# Patient Record
Sex: MALE | Race: WHITE | NOT HISPANIC OR LATINO | Employment: UNEMPLOYED | URBAN - METROPOLITAN AREA
[De-identification: names, ages, dates, MRNs, and addresses within clinical notes are randomized per-mention and may not be internally consistent; named-entity substitution may affect disease eponyms.]

---
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
## 2022-07-11 ENCOUNTER — TELEPHONE (OUTPATIENT)
Dept: GASTROENTEROLOGY | Facility: CLINIC | Age: 22
End: 2022-07-11

## 2022-07-11 NOTE — TELEPHONE ENCOUNTER
Caller: PRIOR NILAY    Eric call back number: [phone]    Chief complaint: DIARRHEA AND ACID REFLUX MEDICATION IS NO LONGER WORKING.    Type of visit: FOLLOW-UP    Requested date: PLEASE CALL TO SCHEDULE.    If rescheduling, when is the original appointment: 7/11/2022     Additional notes: APPOINTMENT WAS DOUBLE BOOKED IN ERROR.

## 2022-07-12 NOTE — TELEPHONE ENCOUNTER
Patient called, spoke with mother. Advise as per Rabia's note. She verb understanding and will relay message to the patient.

## 2022-08-24 NOTE — TELEPHONE ENCOUNTER
Caller: Jeffrey Rosas ELO    Relationship: Self    Best call back number: [phone]    Requested Prescriptions:   Requested Prescriptions     Pending Prescriptions Disp Refills   • omeprazole (priLOSEC) 40 MG capsule 30 capsule 12     Sig: Take 1 capsule by mouth Daily.   • hyoscyamine (LEVSIN) 0.125 MG SL tablet 60 tablet 5     Sig: Take 1 tablet by mouth Every 4 (Four) Hours As Needed for Cramping.        Pharmacy where request should be sent:  [address] [place] [address]     Does the patient have less than a 3 day supply:  [x] Yes  [] No    Kiana RIVERO Rep   08/24/22 09:38 EDT

## 2022-08-29 RX ORDER — OMEPRAZOLE 40 MG/1
40 CAPSULE, DELAYED RELEASE ORAL DAILY
Qty: 30 CAPSULE | Refills: 12 | OUTPATIENT
Start: 2022-08-29

## 2022-12-21 ENCOUNTER — OFFICE VISIT (OUTPATIENT)
Dept: GASTROENTEROLOGY | Facility: CLINIC | Age: 22
End: 2022-12-21

## 2022-12-21 VITALS
HEIGHT: 67 IN | WEIGHT: 202 LBS | TEMPERATURE: 97.2 F | BODY MASS INDEX: 31.71 KG/M2 | SYSTOLIC BLOOD PRESSURE: 127 MMHG | DIASTOLIC BLOOD PRESSURE: 79 MMHG | HEART RATE: 76 BPM

## 2022-12-21 DIAGNOSIS — K21.9 GASTROESOPHAGEAL REFLUX DISEASE, UNSPECIFIED WHETHER ESOPHAGITIS PRESENT: ICD-10-CM

## 2022-12-21 DIAGNOSIS — K58.0 IRRITABLE BOWEL SYNDROME WITH DIARRHEA: Primary | ICD-10-CM

## 2022-12-21 PROCEDURE — 99214 OFFICE O/P EST MOD 30 MIN: CPT | Performed by: NURSE PRACTITIONER

## 2022-12-21 RX ORDER — LEVALBUTEROL TARTRATE 45 UG/1
1-2 AEROSOL, METERED ORAL
COMMUNITY
Start: 2022-12-20 | End: 2023-01-19

## 2022-12-21 RX ORDER — ONDANSETRON 4 MG/1
4 TABLET, ORALLY DISINTEGRATING ORAL EVERY 8 HOURS PRN
Qty: 25 TABLET | Refills: 3 | Status: SHIPPED | OUTPATIENT
Start: 2022-12-21

## 2022-12-21 RX ORDER — PANTOPRAZOLE SODIUM 40 MG/1
40 TABLET, DELAYED RELEASE ORAL DAILY
Qty: 90 TABLET | Refills: 3 | Status: SHIPPED | OUTPATIENT
Start: 2022-12-21

## 2022-12-21 RX ORDER — DICYCLOMINE HYDROCHLORIDE 10 MG/1
10 CAPSULE ORAL 3 TIMES DAILY PRN
Qty: 120 CAPSULE | Refills: 3 | Status: SHIPPED | OUTPATIENT
Start: 2022-12-21

## 2022-12-21 RX ORDER — FLUTICASONE PROPIONATE AND SALMETEROL 50; 500 UG/1; UG/1
POWDER RESPIRATORY (INHALATION)
COMMUNITY
Start: 2022-12-20

## 2022-12-21 RX ORDER — ONDANSETRON 4 MG/1
TABLET, ORALLY DISINTEGRATING ORAL
COMMUNITY
Start: 2022-11-29 | End: 2022-12-21 | Stop reason: SDUPTHER

## 2022-12-21 NOTE — PROGRESS NOTES
"Chief Complaint   Patient presents with   • Irritable Bowel Syndrome   • Diarrhea       HPI    Jeffrey JULIEN is a  22 y.o. male here for a follow up visit for irritable bowel syndrome and GERD.    This patient follows with Dr. Deal, new to me.    He underwent bidirectional endoscopic examination summer 2021 reviewed:    EGD w/ grade a reflux esophagitis otherwise normal empiric dilation of the esophagus performed.  C-scope w/ normal ileum and nonbleeding internal hemorrhoids.  Pathology was benign    On visit today reports increase in IBS symptoms over the last several months with the abdominal cramps and more episodes of diarrhea.  No rectal bleeding, abdominal pain, or rectal pain.  He finds hyoscyamine is becoming less effective.  He cannot recall his response to Xifaxan after last colonoscopy reports he has a \"poor memory.\"    He reports adequate control heartburn on once daily pantoprazole needs refill.  He also needs a refill on Zofran taken as needed for \"motion sickness\".    Family history of ulcerative colitis in his grandmother  Family history of colon cancer in his grandfather  Past Medical History:   Diagnosis Date   • Anemia    • Asthma    • Chronic diarrhea    • GERD (gastroesophageal reflux disease) 2021   • Irritable bowel syndrome 2021       Past Surgical History:   Procedure Laterality Date   • COLONOSCOPY  2021   • UPPER GASTROINTESTINAL ENDOSCOPY  2021       Scheduled Meds:     Continuous Infusions: No current facility-administered medications for this visit.      PRN Meds:     No Known Allergies    Social History     Socioeconomic History   • Marital status: Single   Tobacco Use   • Smoking status: Never   Substance and Sexual Activity   • Alcohol use: Not Currently   • Drug use: Not Currently   • Sexual activity: Yes     Partners: Female       Family History   Problem Relation Age of Onset   • Ulcerative colitis Maternal Grandmother    • Colon cancer Maternal Grandfather    • Liver cancer " Maternal Grandfather        Review of Systems   Constitutional: Negative for activity change, appetite change, fatigue, fever and unexpected weight change.   HENT: Negative for trouble swallowing.    Respiratory: Negative for apnea, cough, choking, chest tightness, shortness of breath and wheezing.    Cardiovascular: Negative for chest pain, palpitations and leg swelling.   Gastrointestinal: Positive for diarrhea and nausea. Negative for abdominal distention, abdominal pain, anal bleeding, blood in stool, constipation, rectal pain and vomiting.       Vitals:    12/21/22 0907   BP: 127/79   Pulse: 76   Temp: 97.2 °F (36.2 °C)       Physical Exam  Constitutional:       Appearance: He is well-developed.   Abdominal:      General: Bowel sounds are normal. There is no distension.      Palpations: Abdomen is soft. There is no mass.      Tenderness: There is no abdominal tenderness. There is no guarding.      Hernia: No hernia is present.   Skin:     General: Skin is warm and dry.      Capillary Refill: Capillary refill takes less than 2 seconds.   Neurological:      Mental Status: He is alert and oriented to person, place, and time.   Psychiatric:         Behavior: Behavior normal.     Assessment    Diagnoses and all orders for this visit:    1. Irritable bowel syndrome with diarrhea (Primary)    2. Gastroesophageal reflux disease, unspecified whether esophagitis present    Other orders  -     pantoprazole (PROTONIX) 40 MG EC tablet; Take 1 tablet by mouth Daily.  Dispense: 90 tablet; Refill: 3  -     ondansetron ODT (ZOFRAN-ODT) 4 MG disintegrating tablet; Place 1 tablet on the tongue Every 8 (Eight) Hours As Needed for Nausea or Vomiting.  Dispense: 25 tablet; Refill: 3  -     riFAXIMin (Xifaxan) 550 MG tablet; Take 1 tablet by mouth 3 (Three) Times a Day for 14 days.  Dispense: 42 tablet; Refill: 0  -     dicyclomine (BENTYL) 10 MG capsule; Take 1 capsule by mouth 3 (Three) Times a Day As Needed (Abdominal cramps and  diarrhea).  Dispense: 120 capsule; Refill: 3       Plan    Patient having increase symptoms of irritable bowel syndrome over the last several months as such recommend course of Xifaxan as above.  Stop Levsin.  Give trial of Bentyl antispasmodic discussed dosing to proactively treat symptoms.  Stable GERD, continue PPI therapy.  Continue as needed Zofran.  Offered 6-week follow-up but patient prefers to follow-up as needed.         ATIYA De La Garza  [facility]  [address] [place] [address]  Office: [phone]

## 2023-04-20 ENCOUNTER — OFFICE VISIT (OUTPATIENT)
Dept: FAMILY MEDICINE CLINIC | Facility: CLINIC | Age: 23
End: 2023-04-20
Payer: COMMERCIAL

## 2023-04-20 VITALS
BODY MASS INDEX: 29.33 KG/M2 | WEIGHT: 198 LBS | OXYGEN SATURATION: 97 % | HEIGHT: 69 IN | DIASTOLIC BLOOD PRESSURE: 72 MMHG | HEART RATE: 82 BPM | TEMPERATURE: 97.7 F | RESPIRATION RATE: 16 BRPM | SYSTOLIC BLOOD PRESSURE: 111 MMHG

## 2023-04-20 DIAGNOSIS — F41.9 ANXIETY AND DEPRESSION: Primary | ICD-10-CM

## 2023-04-20 DIAGNOSIS — F32.A ANXIETY AND DEPRESSION: Primary | ICD-10-CM

## 2023-04-20 DIAGNOSIS — H91.93 HEARING DECREASED, BILATERAL: ICD-10-CM

## 2023-04-20 PROCEDURE — 99203 OFFICE O/P NEW LOW 30 MIN: CPT | Performed by: PHYSICIAN ASSISTANT

## 2023-04-20 RX ORDER — HYDROXYZINE HYDROCHLORIDE 25 MG/1
25 TABLET, FILM COATED ORAL EVERY 6 HOURS PRN
Qty: 40 TABLET | Refills: 5 | Status: SHIPPED | OUTPATIENT
Start: 2023-04-20

## 2023-04-20 NOTE — PROGRESS NOTES
Philippe Gonzalez is a 22 y.o. male.     History of Present Illness  Pt presents as a new patient to establish care.  He has hx of asthma and allergies. He sees pulmonologist at [facility].    He has hx of GERD and sees GI. He is on pantoprazole which helps with his GERD when he takes it. He had to have upper endoscopy and colonoscopy back in 2021 due to symptoms.  He was diagnosed with IBS.      He had COVID19, RSV and influenza in the last 6 months.    He has hx of anxiety.  He is needing refill of hydroxyzine to take as needed when he gets panicked.  He also has anxiety, depression, suicidal ideation in the past.    He recently just got his 's license and a job.  He still is feeling depressed but no suicidal thoughts.       The following portions of the patient's history were reviewed and updated as appropriate: allergies, current medications, past family history, past medical history, past social history, past surgical history and problem list.  Past Medical History:   Diagnosis Date   • Anemia    • Anxiety    • Asthma    • Chronic diarrhea    • Depression    • GERD (gastroesophageal reflux disease) 2021   • Irritable bowel syndrome 2021     Past Surgical History:   Procedure Laterality Date   • COLONOSCOPY  2021   • TONSILLECTOMY  2003   • UPPER GASTROINTESTINAL ENDOSCOPY  2021     Family History   Problem Relation Age of Onset   • Ulcerative colitis Maternal Grandmother    • Asthma Maternal Grandmother    • Cancer Maternal Grandmother    • COPD Maternal Grandmother    • Colon cancer Maternal Grandfather    • Liver cancer Maternal Grandfather    • Cancer Maternal Grandfather    • COPD Maternal Grandfather    • Early death Maternal Grandfather    • Heart disease Maternal Grandfather    • Anxiety disorder Mother    • Asthma Mother    • Depression Mother    • Alcohol abuse Father    • Mental illness Father    • Anxiety disorder Sister    • Asthma Sister    • Depression Sister    • Developmental  Disability Sister    • Learning disabilities Sister    • Mental illness Sister    • Anxiety disorder Sister    • Asthma Sister    • Depression Sister    • Developmental Disability Sister    • Learning disabilities Sister    • Mental illness Sister    • Anxiety disorder Brother    • Depression Brother    • Drug abuse Maternal Uncle    • Drug abuse Maternal Uncle    • Heart disease Maternal Uncle      Social History     Socioeconomic History   • Marital status: Single   Tobacco Use   • Smoking status: Never   • Smokeless tobacco: Never   Vaping Use   • Vaping Use: Never used   Substance and Sexual Activity   • Alcohol use: Not Currently   • Drug use: Not Currently   • Sexual activity: Not Currently     Partners: Female     Birth control/protection: Condom         Current Outpatient Medications:   •  Advair Diskus 500-50 MCG/ACT DISKUS, , Disp: , Rfl:   •  cetirizine (zyrTEC) 10 MG tablet, Take 1 tablet by mouth Daily., Disp: , Rfl:   •  fluticasone (FLONASE) 50 MCG/ACT nasal spray, 2 sprays into the nostril(s) as directed by provider Daily., Disp: , Rfl:   •  levalbuterol (XOPENEX HFA) 45 MCG/ACT inhaler, Inhale 1-2 puffs., Disp: , Rfl:   •  ondansetron ODT (ZOFRAN-ODT) 4 MG disintegrating tablet, Place 1 tablet on the tongue Every 8 (Eight) Hours As Needed for Nausea or Vomiting., Disp: 25 tablet, Rfl: 3  •  pantoprazole (PROTONIX) 40 MG EC tablet, Take 1 tablet by mouth Daily., Disp: 90 tablet, Rfl: 3  •  albuterol sulfate  (90 Base) MCG/ACT inhaler, Inhale 2 puffs Every 4 (Four) Hours As Needed for Wheezing., Disp: , Rfl:   •  budesonide-formoterol (SYMBICORT) 160-4.5 MCG/ACT inhaler, Inhale 2 puffs 2 (Two) Times a Day., Disp: , Rfl:   •  hydrOXYzine (ATARAX) 25 MG tablet, Take 1 tablet by mouth Every 6 (Six) Hours As Needed for Anxiety., Disp: 40 tablet, Rfl: 5    Review of Systems   Constitutional: Negative for activity change, appetite change, chills, diaphoresis, fatigue, fever, unexpected weight gain  "and unexpected weight loss.   HENT: Negative for congestion and trouble swallowing.    Eyes: Negative for blurred vision and visual disturbance.   Respiratory: Negative for chest tightness, shortness of breath and wheezing.    Cardiovascular: Negative for chest pain and palpitations.   Gastrointestinal: Positive for diarrhea. Negative for abdominal pain, constipation, nausea and vomiting.   Musculoskeletal: Negative for gait problem.   Neurological: Negative for dizziness, tremors, syncope, weakness, light-headedness, headache and confusion.   Psychiatric/Behavioral: Positive for depressed mood and stress. Negative for self-injury, sleep disturbance and suicidal ideas. The patient is nervous/anxious.      /72 (BP Location: Right arm, Patient Position: Sitting, Cuff Size: Large Adult)   Pulse 82   Temp 97.7 °F (36.5 °C) (Temporal)   Resp 16   Ht 176 cm (69.29\")   Wt 89.8 kg (198 lb)   SpO2 97%   BMI 28.99 kg/m²       Objective   Physical Exam  Vitals and nursing note reviewed.   Constitutional:       Appearance: Normal appearance.   HENT:      Head: Normocephalic and atraumatic.      Right Ear: Tympanic membrane, ear canal and external ear normal.      Left Ear: Tympanic membrane, ear canal and external ear normal.   Eyes:      Pupils: Pupils are equal, round, and reactive to light.   Neck:      Vascular: No carotid bruit.   Cardiovascular:      Rate and Rhythm: Normal rate and regular rhythm.      Heart sounds: Normal heart sounds.   Pulmonary:      Effort: Pulmonary effort is normal.      Breath sounds: Normal breath sounds.   Abdominal:      General: Abdomen is flat. Bowel sounds are normal.      Palpations: Abdomen is soft.      Tenderness: There is no abdominal tenderness.   Musculoskeletal:         General: Normal range of motion.      Cervical back: Normal range of motion and neck supple.   Skin:     General: Skin is warm and dry.   Neurological:      General: No focal deficit present.      Mental " Status: He is alert and oriented to person, place, and time.   Psychiatric:         Mood and Affect: Mood normal.         Behavior: Behavior normal.         Thought Content: Thought content normal.         Procedures       Diagnoses and all orders for this visit:    1. Anxiety and depression (Primary)  Comments:  Will send hydroxyzine and send for therapy.  Orders:  -     Ambulatory Referral to Psychology  -     hydrOXYzine (ATARAX) 25 MG tablet; Take 1 tablet by mouth Every 6 (Six) Hours As Needed for Anxiety.  Dispense: 40 tablet; Refill: 5    2. Hearing decreased, bilateral  Comments:  Pt to see ENT/audiology for further evaluation.  Orders:  -     Ambulatory Referral to ENT (Otolaryngology)          I spent 30 minutes of patient care including reviewing pt's previous hx, reviewing current symptoms, performing exam, ordering tests, discussing treatment plan and completing my note.

## 2023-04-26 ENCOUNTER — TELEPHONE (OUTPATIENT)
Dept: FAMILY MEDICINE CLINIC | Facility: CLINIC | Age: 23
End: 2023-04-26
Payer: COMMERCIAL

## 2023-04-26 NOTE — TELEPHONE ENCOUNTER
Caller: NILAY BELLA    Relationship: Mother    Best call back number:   NILAY BELLA (Mother) [phone] (Mobile)       What was the call regarding: STILL HAVING ISSUES WITH THE MUCUS / IT'S BLOODY / CHOKING HIM AND MAKING NAUSEOUS   WANTING TO THROW UP       Do you require a callback: YES PLEASE ADVISE       [facility] [id number] - [place], [place] - [address] [place] [address] - [phone] [place] [phone] [phone]

## 2023-09-11 ENCOUNTER — TELEPHONE (OUTPATIENT)
Dept: GASTROENTEROLOGY | Facility: CLINIC | Age: 23
End: 2023-09-11
Payer: MEDICAID

## 2023-09-11 NOTE — TELEPHONE ENCOUNTER
Caller: Jeffrey Gonzalez    Relationship to patient: Self    Best call back number: [phone]    Patient is needing: PATIENT IS CALLING BECAUSE HE IS CURRENTLY TAKING DICYCLOMINE HOWEVER IT IS NOT WORKING AND WOULD LIKE TO ASK ABOUT ANY OTHER OPTIONS FOR HIS IBS.

## 2023-09-13 ENCOUNTER — OFFICE VISIT (OUTPATIENT)
Dept: FAMILY MEDICINE CLINIC | Facility: CLINIC | Age: 23
End: 2023-09-13
Payer: MEDICAID

## 2023-09-13 VITALS
SYSTOLIC BLOOD PRESSURE: 128 MMHG | TEMPERATURE: 98.6 F | HEIGHT: 70 IN | BODY MASS INDEX: 30.35 KG/M2 | HEART RATE: 109 BPM | OXYGEN SATURATION: 97 % | WEIGHT: 212 LBS | DIASTOLIC BLOOD PRESSURE: 80 MMHG

## 2023-09-13 DIAGNOSIS — N50.812 PAIN IN LEFT TESTICLE: Primary | ICD-10-CM

## 2023-09-13 DIAGNOSIS — N50.89 MASS OF LEFT TESTICLE: ICD-10-CM

## 2023-09-13 NOTE — PROGRESS NOTES
"Chief Complaint  Testicle Pain (On left testicle, Patient states its swollen and purple. Patient states that he noticed it Sunday.)    Subjective        Jeffrey Gonzalez presents to [facility]  History of Present Illness    Pt is here today for left testicular pain. He noticed this on Sunday. It was purple and swollen. The swelling and pain have decreased today. When he urinates he feels pain radiating to his groin. He denies fever, chills, malaise. He has not shaved recently. He denies new sexual partners.     Objective   Vital Signs:  /80 (BP Location: Left arm, Patient Position: Sitting, Cuff Size: Adult)   Pulse 109   Temp 98.6 °F (37 °C) (Temporal)   Ht 177.8 cm (70\")   Wt 96.2 kg (212 lb)   SpO2 97%   BMI 30.42 kg/m²   Estimated body mass index is 30.42 kg/m² as calculated from the following:    Height as of this encounter: 177.8 cm (70\").    Weight as of this encounter: 96.2 kg (212 lb).                Physical Exam  Vitals reviewed. Exam conducted with a chaperone present.   Constitutional:       General: He is not in acute distress.     Appearance: Normal appearance. He is not ill-appearing, toxic-appearing or diaphoretic.   Cardiovascular:      Rate and Rhythm: Normal rate and regular rhythm.      Pulses: Normal pulses.      Heart sounds: Normal heart sounds.   Pulmonary:      Effort: Pulmonary effort is normal. No respiratory distress.      Breath sounds: Normal breath sounds.   Genitourinary:     Testes:         Right: Mass, tenderness or swelling not present.         Left: Mass, tenderness and swelling present.   Skin:     General: Skin is warm and dry.      Capillary Refill: Capillary refill takes less than 2 seconds.   Neurological:      General: No focal deficit present.      Mental Status: He is alert and oriented to person, place, and time.   Psychiatric:         Mood and Affect: Mood normal.         Behavior: Behavior normal.         Thought Content: " Thought content normal.         Judgment: Judgment normal.      Result Review :                Assessment and Plan   Diagnoses and all orders for this visit:    1. Pain in left testicle (Primary)  -     US Scrotum & Testicles; Future    2. Mass of left testicle             Follow Up   Return if symptoms worsen or fail to improve.  Patient was given instructions and counseling regarding his condition or for health maintenance advice. Please see specific information pulled into the AVS if appropriate.

## 2023-09-14 NOTE — TELEPHONE ENCOUNTER
Patient called. Spoke with mother, Brittany who is on the BRISSA.   Appointment scheduled for 10/13@1030 with Palma. She verb understanding.

## 2023-09-20 ENCOUNTER — TELEPHONE (OUTPATIENT)
Dept: FAMILY MEDICINE CLINIC | Facility: CLINIC | Age: 23
End: 2023-09-20
Payer: MEDICAID

## 2023-09-20 NOTE — TELEPHONE ENCOUNTER
Patient referral for US scrotum & testicles was routed back with this message:     9/19 Spoke with pts mother, Leonor, and she states that the area that is being scanned has 'busted' and the pt was supposed to be checking with the [facility] to see if this US is still the treatment that they want to take. Leonor is going to contact the pt and check with him before scheduling, thank you. Routing back to [facility]    Please advise

## 2024-01-10 ENCOUNTER — OFFICE VISIT (OUTPATIENT)
Dept: FAMILY MEDICINE CLINIC | Facility: CLINIC | Age: 24
End: 2024-01-10
Payer: MEDICAID

## 2024-01-10 VITALS
TEMPERATURE: 97.3 F | SYSTOLIC BLOOD PRESSURE: 143 MMHG | DIASTOLIC BLOOD PRESSURE: 87 MMHG | HEIGHT: 70 IN | HEART RATE: 75 BPM | WEIGHT: 203 LBS | BODY MASS INDEX: 29.06 KG/M2 | RESPIRATION RATE: 16 BRPM | OXYGEN SATURATION: 100 %

## 2024-01-10 DIAGNOSIS — R11.2 NAUSEA AND VOMITING, UNSPECIFIED VOMITING TYPE: Primary | ICD-10-CM

## 2024-01-10 LAB
EXPIRATION DATE: NORMAL
FLUAV AG UPPER RESP QL IA.RAPID: NOT DETECTED
FLUBV AG UPPER RESP QL IA.RAPID: NOT DETECTED
INTERNAL CONTROL: NORMAL
Lab: NORMAL
SARS-COV-2 AG UPPER RESP QL IA.RAPID: NOT DETECTED

## 2024-01-10 PROCEDURE — 99214 OFFICE O/P EST MOD 30 MIN: CPT | Performed by: NURSE PRACTITIONER

## 2024-01-10 PROCEDURE — 87428 SARSCOV & INF VIR A&B AG IA: CPT | Performed by: NURSE PRACTITIONER

## 2024-01-10 RX ORDER — ONDANSETRON HYDROCHLORIDE 8 MG/1
8 TABLET, FILM COATED ORAL EVERY 8 HOURS PRN
Qty: 30 TABLET | Refills: 0 | Status: SHIPPED | OUTPATIENT
Start: 2024-01-10

## 2024-01-10 RX ORDER — PANTOPRAZOLE SODIUM 40 MG/1
40 TABLET, DELAYED RELEASE ORAL DAILY
Qty: 30 TABLET | OUTPATIENT
Start: 2024-01-10

## 2024-01-10 NOTE — PROGRESS NOTES
Chief Complaint  Vomiting, Diarrhea, Headache, and Dizziness    Subjective        Jeffrey Gonzalez presents to [facility]  History of Present Illness  Jeffrey is a 23-year-old male presenting today with complaints of headache, chills, vomiting and diarrhea.  His symptoms started about a week ago with bodyaches, dizziness, and lightheadedness.  In the last 2 to 3 days he has been vomiting.  He says it is triggered by movement.  He says he can keep liquids down, but is having difficulty with foods.  His emesis is yellow/green.  Denies any blood.  He denies any fever.  He reports some shortness of breath.  He took a home COVID test and it was negative, but he says he has been exposed to lot of people sick at work.      The following portions of the patient's history were reviewed and updated as appropriate: allergies, current medications, past family history, past medical history, past social history, past surgical history and problem list.    No Known Allergies    Patient Active Problem List   Diagnosis    Allergic rhinitis    Asthma    Hypermobility syndrome       Current Outpatient Medications   Medication Instructions    Advair Diskus 500-50 MCG/ACT DISKUS No dose, route, or frequency recorded.    albuterol sulfate  (90 Base) MCG/ACT inhaler 2 puffs, Inhalation, Every 4 Hours PRN    amitriptyline (ELAVIL) 10 mg, Oral, Nightly    budesonide-formoterol (SYMBICORT) 160-4.5 MCG/ACT inhaler 2 puffs, Inhalation, 2 Times Daily - RT    cetirizine (ZYRTEC) 10 mg, Oral, Daily    fluticasone (FLONASE) 50 MCG/ACT nasal spray 2 sprays, Nasal, Daily    hydrOXYzine (ATARAX) 25 mg, Oral, Every 6 Hours PRN    levalbuterol (XOPENEX HFA) 45 MCG/ACT inhaler 1-2 puffs, Inhalation    ondansetron (ZOFRAN) 8 mg, Oral, Every 8 Hours PRN    pantoprazole (PROTONIX) 40 mg, Oral, Daily          Objective   Vital Signs:  /87 (BP Location: Right arm, Patient Position: Sitting, Cuff Size: Adult)   Pulse  "75   Temp 97.3 °F (36.3 °C) (Temporal)   Resp 16   Ht 177.8 cm (70\")   Wt 92.1 kg (203 lb)   SpO2 100%   BMI 29.13 kg/m²   Estimated body mass index is 29.13 kg/m² as calculated from the following:    Height as of this encounter: 177.8 cm (70\").    Weight as of this encounter: 92.1 kg (203 lb).             Review of Systems   Constitutional:  Positive for chills and fatigue. Negative for appetite change and fever.   HENT:  Positive for rhinorrhea and sore throat. Negative for congestion, ear pain, postnasal drip, sinus pressure, sinus pain, sneezing and tinnitus.    Eyes:  Negative for redness.   Respiratory:  Positive for shortness of breath. Negative for cough, chest tightness and wheezing.    Cardiovascular:  Negative for chest pain.   Gastrointestinal:  Positive for nausea and vomiting.   Musculoskeletal:  Negative for myalgias.   Allergic/Immunologic: Negative for environmental allergies.   Neurological:  Positive for headaches. Negative for dizziness, syncope, weakness and light-headedness.        Physical Exam  Constitutional:       Appearance: Normal appearance.   HENT:      Head: Normocephalic and atraumatic.      Right Ear: Tympanic membrane, ear canal and external ear normal.      Left Ear: Tympanic membrane, ear canal and external ear normal.      Nose: Nose normal.      Mouth/Throat:      Mouth: Mucous membranes are moist.      Pharynx: Oropharynx is clear.   Eyes:      Extraocular Movements: Extraocular movements intact.      Conjunctiva/sclera: Conjunctivae normal.      Pupils: Pupils are equal, round, and reactive to light.   Cardiovascular:      Rate and Rhythm: Normal rate and regular rhythm.   Pulmonary:      Effort: Pulmonary effort is normal.      Breath sounds: Normal breath sounds.   Abdominal:      General: Abdomen is flat. Bowel sounds are increased.      Palpations: Abdomen is soft.      Tenderness: There is no abdominal tenderness.   Musculoskeletal:      Cervical back: Normal range " of motion and neck supple.   Skin:     General: Skin is warm and dry.   Neurological:      Mental Status: He is alert and oriented to person, place, and time.   Psychiatric:         Mood and Affect: Mood normal.         Behavior: Behavior normal.         Thought Content: Thought content normal.         Judgment: Judgment normal.        Result Review :                   Assessment and Plan   Diagnoses and all orders for this visit:    1. Nausea and vomiting, unspecified vomiting type (Primary)  Comments:  Covid/Flu negative  zofran ordered  eat bland diet  push fluids  call if no improvement  Orders:  -     POCT SARS-CoV-2 Antigen GALO + Flu    Other orders  -     ondansetron (Zofran) 8 MG tablet; Take 1 tablet by mouth Every 8 (Eight) Hours As Needed for Nausea or Vomiting.  Dispense: 30 tablet; Refill: 0             Follow Up   No follow-ups on file.  Patient was given instructions and counseling regarding his condition or for health maintenance advice. Please see specific information pulled into the AVS if appropriate.

## 2024-02-01 ENCOUNTER — OFFICE VISIT (OUTPATIENT)
Dept: FAMILY MEDICINE CLINIC | Facility: CLINIC | Age: 24
End: 2024-02-01
Payer: MEDICAID

## 2024-02-01 VITALS
OXYGEN SATURATION: 98 % | DIASTOLIC BLOOD PRESSURE: 86 MMHG | HEIGHT: 70 IN | HEART RATE: 88 BPM | BODY MASS INDEX: 29.06 KG/M2 | TEMPERATURE: 98.6 F | WEIGHT: 203 LBS | SYSTOLIC BLOOD PRESSURE: 125 MMHG

## 2024-02-01 DIAGNOSIS — R06.02 SHORTNESS OF BREATH: ICD-10-CM

## 2024-02-01 DIAGNOSIS — R05.9 COUGH, UNSPECIFIED TYPE: Primary | ICD-10-CM

## 2024-02-01 DIAGNOSIS — R53.83 FATIGUE, UNSPECIFIED TYPE: ICD-10-CM

## 2024-02-01 DIAGNOSIS — J06.9 UPPER RESPIRATORY TRACT INFECTION, UNSPECIFIED TYPE: ICD-10-CM

## 2024-02-01 PROCEDURE — 87428 SARSCOV & INF VIR A&B AG IA: CPT

## 2024-02-01 PROCEDURE — 1160F RVW MEDS BY RX/DR IN RCRD: CPT

## 2024-02-01 PROCEDURE — 99213 OFFICE O/P EST LOW 20 MIN: CPT

## 2024-02-01 PROCEDURE — 1159F MED LIST DOCD IN RCRD: CPT

## 2024-02-01 RX ORDER — AMOXICILLIN 875 MG/1
875 TABLET, COATED ORAL 2 TIMES DAILY
Qty: 20 TABLET | Refills: 0 | Status: SHIPPED | OUTPATIENT
Start: 2024-02-01 | End: 2024-02-11

## 2024-02-01 RX ORDER — METHYLPREDNISOLONE 4 MG/1
TABLET ORAL
Qty: 21 TABLET | Refills: 0 | Status: SHIPPED | OUTPATIENT
Start: 2024-02-01

## 2024-02-01 NOTE — PROGRESS NOTES
"Chief Complaint  Cough (2 days ), Fatigue, URI (In chest), and burning  (In throat)    Subjective        Jeffrey Gonzalez presents to [facility]  History of Present Illness    Pt is here today for cough, fatigue, chest congestion, sore throat. This has been going on 5 days. He felt very tired, now he is having a sore throat and some pains when he takes in a deep breath. He denies fever, chills, malaise. He reports a couple of his coworkers were sick too.      Objective   Vital Signs:  /86 (BP Location: Right arm, Patient Position: Sitting, Cuff Size: Large Adult)   Pulse 88   Temp 98.6 °F (37 °C) (Temporal)   Ht 177.8 cm (70\")   Wt 92.1 kg (203 lb)   SpO2 98%   BMI 29.13 kg/m²   Estimated body mass index is 29.13 kg/m² as calculated from the following:    Height as of this encounter: 177.8 cm (70\").    Weight as of this encounter: 92.1 kg (203 lb).                Physical Exam  Vitals reviewed.   Constitutional:       General: He is not in acute distress.     Appearance: Normal appearance. He is ill-appearing. He is not toxic-appearing or diaphoretic.   HENT:      Nose: Congestion present.      Mouth/Throat:      Mouth: Mucous membranes are moist.      Pharynx: Posterior oropharyngeal erythema present. No oropharyngeal exudate.   Cardiovascular:      Rate and Rhythm: Normal rate and regular rhythm.      Pulses: Normal pulses.      Heart sounds: Normal heart sounds.   Pulmonary:      Effort: Pulmonary effort is normal. No respiratory distress.      Breath sounds: Normal breath sounds. No wheezing.   Skin:     General: Skin is warm and dry.      Capillary Refill: Capillary refill takes less than 2 seconds.   Neurological:      General: No focal deficit present.      Mental Status: He is alert and oriented to person, place, and time.   Psychiatric:         Mood and Affect: Mood normal.         Behavior: Behavior normal.         Thought Content: Thought content normal.         " Judgment: Judgment normal.        Result Review :                Assessment and Plan   Diagnoses and all orders for this visit:    1. Cough, unspecified type (Primary)  -     POCT SARS-CoV-2 Antigen GALO + Flu    2. Upper respiratory tract infection, unspecified type  -     amoxicillin (AMOXIL) 875 MG tablet; Take 1 tablet by mouth 2 (Two) Times a Day for 10 days.  Dispense: 20 tablet; Refill: 0    3. Shortness of breath  -     methylPREDNISolone (MEDROL) 4 MG dose pack; Take as directed on package instructions.  Dispense: 21 tablet; Refill: 0    4. Fatigue, unspecified type             Follow Up   Return if symptoms worsen or fail to improve.  Patient was given instructions and counseling regarding his condition or for health maintenance advice. Please see specific information pulled into the AVS if appropriate.

## 2024-02-01 NOTE — LETTER
February 1, 2024     Patient: Jeffrey Gonzalez   YOB: 2000   Date of Visit: 2/1/2024       To Whom It May Concern:    It is my medical opinion that Jeffrey Gonzalez may return to work on 02/02/24.         Sincerely,        ATIYA Dickson    CC: No Recipients

## 2024-02-05 RX ORDER — PANTOPRAZOLE SODIUM 40 MG/1
40 TABLET, DELAYED RELEASE ORAL DAILY
Qty: 30 TABLET | OUTPATIENT
Start: 2024-02-05

## 2024-04-30 ENCOUNTER — SPECIALTY PHARMACY (OUTPATIENT)
Dept: GASTROENTEROLOGY | Facility: CLINIC | Age: 24
End: 2024-04-30
Payer: MEDICAID

## 2024-04-30 ENCOUNTER — OFFICE VISIT (OUTPATIENT)
Dept: GASTROENTEROLOGY | Facility: CLINIC | Age: 24
End: 2024-04-30
Payer: MEDICAID

## 2024-04-30 VITALS
HEIGHT: 69 IN | BODY MASS INDEX: 27.55 KG/M2 | SYSTOLIC BLOOD PRESSURE: 111 MMHG | HEART RATE: 89 BPM | TEMPERATURE: 96.6 F | WEIGHT: 186 LBS | DIASTOLIC BLOOD PRESSURE: 69 MMHG

## 2024-04-30 DIAGNOSIS — K21.9 GASTROESOPHAGEAL REFLUX DISEASE WITHOUT ESOPHAGITIS: ICD-10-CM

## 2024-04-30 DIAGNOSIS — K58.0 IRRITABLE BOWEL SYNDROME WITH DIARRHEA: Primary | ICD-10-CM

## 2024-04-30 PROCEDURE — 1159F MED LIST DOCD IN RCRD: CPT | Performed by: PHYSICIAN ASSISTANT

## 2024-04-30 PROCEDURE — 99214 OFFICE O/P EST MOD 30 MIN: CPT | Performed by: PHYSICIAN ASSISTANT

## 2024-04-30 PROCEDURE — 1160F RVW MEDS BY RX/DR IN RCRD: CPT | Performed by: PHYSICIAN ASSISTANT

## 2024-04-30 RX ORDER — PANTOPRAZOLE SODIUM 40 MG/1
40 TABLET, DELAYED RELEASE ORAL DAILY
Qty: 90 TABLET | Refills: 3 | Status: SHIPPED | OUTPATIENT
Start: 2024-04-30

## 2024-04-30 NOTE — PROGRESS NOTES
Orly Mcgraw is a 26 y.o. female 36w6d        OB History    Para Term  AB Living   2 1 1 0 0 1   SAB TAB Ectopic Molar Multiple Live Births   0 0 0   0 1      # Outcome Date GA Lbr South/2nd Weight Sex Delivery Anes PTL Lv   2 Current            1 Term 19 38w6d 02:25 / 00:22 7 lb 11.3 oz (3.495 kg) M Vag-Spont EPI N TERESA         Vitals  BP: 116/74  Weight: 171 lb (77.6 kg)  Pulse: 86  Patient Position: Sitting  Albumin: Negative  Glucose: Negative      The patient was seen and evaluated. There was positive fetal movements. No contractions or leakage of fluid. Signs and symptoms of labor were reviewed. The S/S of Pre-Eclampsia were reviewed with the patient in detail. She is to report any of these if they occur. She currently denies any of these. Patient reports having pelvic cramping for past several days and yesterday, thought something dropped out of vagina- but unsure what it was. Denies vaginal leaking or SROM. Denies vaginal bleeding.   + fetal movements. Denies headache, blurred vision, epigastric pain. The patient was instructed on fetal kick counts and was given a kick sheet to complete every 8 hours. She was instructed that the baby should move at a minimum of ten times within one hour after a meal. The patient was instructed to lay down on her left side twenty minutes after eating and count movements for up to one hour with a target value of ten movements. She was instructed to notify the office if she did not make that target after two attempts or if after any attempt there was less than four movements. The patient reports that the targets have been made Yes.    9/10/21 Tdap given    testing with weekly NST per MFM starting at 32 weeks    PT PHONE [address]     FOB phone number: [phone] (21 DON'T CALL THIS # per FOB per AKOSUA merrill DM ED)            T-Dap Vaccine (27-36 weeks) Completed: Yes    Allergies:   Allergies as of 10/13/2021 Specialty Pharmacy     [place] state medicaid - can't fill at [facility] DEL[facility]ADO approved  Key: [id number]  PA Case: [id number], Status: Approved, Coverage Starts on: 4/30/2024 12:00:00 AM, Coverage Ends on: 4/30/2025 12:00:00 AM.. Authorization Expiration Date: April 30, 2025     Gracia Dockery  Specialty Pharmacy Technician         - Fully Reviewed 10/13/2021   Allergen Reaction Noted    Sulfa antibiotics  04/08/2015       Group Beta Strep collection was completed. Yes   GBS Results:   Admission on 10/07/2021, Discharged on 10/08/2021   Component Date Value Ref Range Status    Specimen Description 10/07/2021 . VAGINA   Final    Special Requests 10/07/2021 NOT REPORTED   Final    Culture 10/07/2021 NEGATIVE FOR GROUP B STREPTOCOCCI   Final    Specimen Description 10/07/2021 . VAGINA   Final    Special Requests 10/07/2021 NOT REPORTED   Final    Direct Exam 10/07/2021 NEGATIVE for Gardnerella vaginalis   Final    Direct Exam 10/07/2021 NEGATIVE for Candida sp. Final    Direct Exam 10/07/2021 NEGATIVE for Trichomonas vaginalis   Final    Direct Exam 10/07/2021 Method of testing is a DNA probe intended for detection and identification of Candida species, Gardnerella vaginalis, and Trichomonas vaginalis nucleic acid in vaginal fluid specimens from patients with symptoms of vaginitis/vaginosis. Final    Specimen Description 10/07/2021 . CERVIX   Final    C. trachomatis DNA 10/07/2021 NEGATIVE  NEGATIVE Final    Comment: CHLAMYDIA TRACHOMATIS DNA not detected by nucleic acid amplification. This test is intended for medical purposes only and is not valid for the evaluation of   suspected sexual abuse or for other forensic purposes. In certain contexts, culture may be required to meet applicable laws and regulations for   diagnosis of C. trachomatis and N. gonorrhoeae infections. Per 2014  CDC recommendations, this test does not include confirmation of positive results   by an alternative nucleic acid target.  N. gonorrhoeae DNA 10/07/2021 NEGATIVE  NEGATIVE Final    Comment: NEISSERIA GONORRHOEAE DNA not detected by nucleic acid amplification. This test is intended for medical purposes only and is not valid for the evaluation of   suspected sexual abuse or for other forensic purposes.   In certain contexts, culture may be required to meet applicable laws and regulations for   diagnosis of C. trachomatis and N. gonorrhoeae infections. Per 2014  CDC recommendations, this test does not include confirmation of positive results   by an alternative nucleic acid target.  Color, UA 10/07/2021 Yellow  Yellow Final    Turbidity UA 10/07/2021 Cloudy* Clear Final    Glucose, Ur 10/07/2021 NEGATIVE  NEGATIVE Final    Bilirubin Urine 10/07/2021 NEGATIVE  NEGATIVE Final    Ketones, Urine 10/07/2021 TRACE* NEGATIVE Final    Specific [place], UA 10/07/2021 1.025  1.005 - 1.030 Final    Urine Hgb 10/07/2021 NEGATIVE  NEGATIVE Final    pH, UA 10/07/2021 6.0  5.0 - 8.0 Final    Protein, UA 10/07/2021 NEGATIVE  NEGATIVE Final    Urobilinogen, Urine 10/07/2021 Normal  Normal Final    Nitrite, Urine 10/07/2021 NEGATIVE  NEGATIVE Final    Leukocyte Esterase, Urine 10/07/2021 SMALL* NEGATIVE Final    - 10/07/2021        Final    WBC, UA 10/07/2021 10 TO 20  0 - 5 /HPF Final    RBC, UA 10/07/2021 5 TO 10  0 - 2 /HPF Final    Casts UA 10/07/2021 5 TO 10  0 - 2 /LPF Final    Casts UA 10/07/2021 HYALINE  0 - 2 /LPF Final    Crystals, UA 10/07/2021 MANY* None /HPF Final    Crystals, UA 10/07/2021 URIC ACID* None /HPF Final    Epithelial Cells UA 10/07/2021 10 TO 20  0 - 5 /HPF Final    Renal Epithelial, UA 10/07/2021 NOT REPORTED  0 /HPF Final    Bacteria, UA 10/07/2021 MODERATE* None Final    Mucus, UA 10/07/2021 1+* None Final    Trichomonas, UA 10/07/2021 NOT REPORTED  None Final    Amorphous, UA 10/07/2021 NOT REPORTED  None Final    Other Observations UA 10/07/2021 NOT REPORTED  NOT REQ. Final    Yeast, UA 10/07/2021 NOT REPORTED  None Final    Specimen Description 10/08/2021 . CLEAN CATCH URINE   Final    Special Requests 10/08/2021 NOT REPORTED   Final    Culture 10/08/2021 NO SIGNIFICANT GROWTH   Final    Fern Testing (POC) 10/07/2021 NO FERNING PRESENT  NO FERNING PRESENT Final    Nitrazine pH (POC) 10/07/2021 NEGATIVE  NEGATIVE Final   ]  Sensitivities for clindamycin and erythromycin were ordered. No      The patient was counseled on the mandatory call ahead policy. She has been instructed to call the office at anytime prior to going into the hospital so the on-call physician may direct her to the appropriate facility for care. Exceptions were reviewed including but not limited to: Decreased fetal movement, vaginal Bleeding or hemorrhage, trauma, readily expectant delivery, or any instance where she feels 911 should be utilized. The patient was counseled on Labor & Delivery. yes  Route of delivery and counseling on vaginal, operative vaginal, and  sections were completed with the risks of each to both the patient as well as her baby. The possibility of a blood transfusion was discussed as well. The patient was not opposed to receiving a transfusion if needed. The patient was counseled on types of analgesia during labor and is considering either Regional or IV medication the risks, benefits and alternatives were discussed.  Testing:  Weekly NST- to complete in office tomorrow. Assessment:  1. Yoon Linn is a 26 y.o. female  2.    3. 36w6d      Patient Active Problem List    Diagnosis Date Noted    Gestational diabetes mellitus (GDM), antepartum 08/10/2021     Priority: High     Overview Note:     8/10/2021 consult MFM      SVT (supraventricular tachycardia) ([facility] [address] 2019     Priority: High     Overview Note:     2019 Referral to cardiology      Family history of clotting disorder 2019     Priority: High     Overview Note:     2018 patient's maternal aunt with hx of Protein S and C deficiency      ADD (attention deficit disorder)      Priority: High     Overview Note:     2019 stopped taking focalin 8 months ago      Family history of diabetes mellitus 2019     Priority: Medium     Overview Note:     2019 early one hour GTT ordered      36 weeks gestation of pregnancy 10/07/2021    Short femur of fetus on prenatal ultrasound 2021    Late prenatal care 2021    Severe recurrent major depression without psychotic features ([facility] [address] 2020    Seizures ([facility] 03/15/2020     Overview Note:      testing with weekly NST per [facility] starting at 28 weeks       19 M Apg 8/9 Wt 7#11 2019    CMV IgM+ 07/10/2019    Neurocardiogenic syncope 2019     Overview Note:     Diagnosed by Dr. Jefe Mariano MD. See note in media section on 2019.  Family history of blood clots 2019     Overview Note:     Denies first degree relative. Maternal aunt. Diagnosis Orders   1. 36 weeks gestation of pregnancy  Culture, Strep B Screen, Vaginal/Rectal   2. Diet controlled gestational diabetes mellitus (GDM), antepartum     3. Vaginal pain  C.trachomatis N.gonorrhoeae DNA    VAGINITIS DNA PROBE       SVE:no pooling of fluid noted. No fluid noted leaking from cervix. Vaginal cultures collected. GBS collected. SVE : cervix closed/ 50%/-1. Plan:  The patient will return to the office for her next visit in 1 weeks. See antepartum flow sheet. Patient to return to office tomorrow for scheduled weekly NST. Vaginal cultures and GBS collected  Instructed to go to ER with worsening cramping, decreased fetal movement, SROM, vaginal bleeding or leaking of fluid. [facility] 10/20    Counseling on Fetal Movement Kick Counts: The patient was instructed on fetal kick counts and was given a kick sheet to complete every 8 hours. She was instructed that the baby should move at a minimum of ten times within one hour after a meal. The patient was instructed to lay down on her left side twenty minutes after eating and count movements for up to one hour with a target value of ten movements.   She was instructed to notify the office if she did not make that target after two attempts or if after any attempt there was less than four movements. The patient reports that the targets have been made. Patient was seen with total face to face time of 20 minutes. More than 50% of this visit was on counseling and education regarding her    Diagnosis Orders   1. 36 weeks gestation of pregnancy  Culture, Strep B Screen, Vaginal/Rectal   2. Diet controlled gestational diabetes mellitus (GDM), antepartum     3. Vaginal pain  C.trachomatis N.gonorrhoeae DNA    VAGINITIS DNA PROBE    and her options. She was also counseled on her preventative health maintenance recommendations and follow-up.

## 2024-04-30 NOTE — PROGRESS NOTES
"Chief Complaint  Follow-up (Yearly follow up for meds)    Subjective          History Of Present Illness:    Jeffrey Gonzalez is a  23 y.o. male patient of Dr. Deal who presents as a follow-up for GERD and IBS.    Patient reports ongoing diarrhea, primarily after meals.  Patient reports both hyoscyamine and Bentyl did not improve his diarrhea. Patient denies any abdominal pain or blood in the stool.  Patient reports his GERD is well-controlled on pantoprazole therapy.  He denies any dysphagia, nausea, vomiting.  Appetite is good and weight is stable.    Bidirectional endoscopic examination 2021 reviewed:  EGD w/ grade a reflux esophagitis otherwise normal empiric dilation of the esophagus performed.  C-scope w/ normal ileum and nonbleeding internal hemorrhoids.  Pathology was benign    Objective   Vital Signs:   /69   Pulse 89   Temp 96.6 °F (35.9 °C)   Ht 175.3 cm (69\")   Wt 84.4 kg (186 lb)   BMI 27.47 kg/m²       Physical Exam  Vitals reviewed.   Constitutional:       General: He is not in acute distress.     Appearance: Normal appearance. He is not ill-appearing.   HENT:      Head: Normocephalic and atraumatic.      Nose: Nose normal.      Mouth/Throat:      Pharynx: Oropharynx is clear.   Eyes:      Extraocular Movements: Extraocular movements intact.      Conjunctiva/sclera: Conjunctivae normal.      Pupils: Pupils are equal, round, and reactive to light.   Pulmonary:      Effort: Pulmonary effort is normal.   Abdominal:      General: There is no distension.      Palpations: Abdomen is soft. There is no mass.      Tenderness: There is no abdominal tenderness.   Musculoskeletal:         General: No swelling. Normal range of motion.      Cervical back: Normal range of motion.   Skin:     General: Skin is warm and dry.      Findings: No bruising or rash.   Neurological:      General: No focal deficit present.      Mental Status: He is alert and oriented to person, place, and time.      Motor: No " weakness.      Gait: Gait normal.   Psychiatric:         Mood and Affect: Mood normal.          Result Review :   The following data was reviewed by: Palma Lisa PA-C on 04/30/2024:  CMP          7/10/2023    11:47   CMP   Glucose 78    BUN 10    Creatinine 0.62    EGFR 140.3    Sodium 141    Potassium 4.2    Chloride 105    Calcium 9.4    Total Protein 7.1    Albumin 4.3    Globulin 2.8    Total Bilirubin 0.3    Alkaline Phosphatase 145    AST (SGOT) 20    ALT (SGPT) 17    Albumin/Globulin Ratio 1.5    BUN/Creatinine Ratio 16.1    Anion Gap 13.0      CBC          7/10/2023    11:47   CBC   WBC 5.20    RBC 5.64    Hemoglobin 16.0    Hematocrit 47.3    MCV 83.7    MCH 28.3    MCHC 33.8    RDW 13.0    Platelets 239            Assessment and Plan    Diagnoses and all orders for this visit:    1. Irritable bowel syndrome with diarrhea (Primary)  -     riFAXIMin (Xifaxan) 550 MG tablet; Take 1 tablet by mouth Every 8 (Eight) Hours for 14 days.  Dispense: 42 tablet; Refill: 0    2. Gastroesophageal reflux disease without esophagitis  -     pantoprazole (PROTONIX) 40 MG EC tablet; Take 1 tablet by mouth Daily.  Dispense: 90 tablet; Refill: 3       Recommend Xifaxan 550 mg 3 times a day for a total of 14 days for irritable bowel syndrome with diarrhea.  Recommend he start a high-fiber diet and incorporate Metamucil 2-3 capsules daily  Start probiotic such as Yoni lynn probiotic following completion of Xifaxan.  Continue pantoprazole 40 mg daily    Follow Up   Return in about 3 months (around 7/30/2024) for Palma Page PA-C.    Dragon dictation used throughout this note.            Palma Page PA-C   [facility]  [address] [place] [address]  Office: [phone]

## 2024-05-07 ENCOUNTER — LAB (OUTPATIENT)
Dept: FAMILY MEDICINE CLINIC | Facility: CLINIC | Age: 24
End: 2024-05-07
Payer: MEDICAID

## 2024-05-07 ENCOUNTER — OFFICE VISIT (OUTPATIENT)
Dept: FAMILY MEDICINE CLINIC | Facility: CLINIC | Age: 24
End: 2024-05-07
Payer: MEDICAID

## 2024-05-07 VITALS
SYSTOLIC BLOOD PRESSURE: 121 MMHG | WEIGHT: 185.8 LBS | OXYGEN SATURATION: 96 % | RESPIRATION RATE: 18 BRPM | DIASTOLIC BLOOD PRESSURE: 76 MMHG | HEART RATE: 84 BPM | BODY MASS INDEX: 27.52 KG/M2 | HEIGHT: 69 IN | TEMPERATURE: 98.7 F

## 2024-05-07 DIAGNOSIS — R74.8 ALKALINE PHOSPHATASE ELEVATION: ICD-10-CM

## 2024-05-07 DIAGNOSIS — E55.9 VITAMIN D DEFICIENCY: ICD-10-CM

## 2024-05-07 DIAGNOSIS — R11.2 NAUSEA AND VOMITING, UNSPECIFIED VOMITING TYPE: ICD-10-CM

## 2024-05-07 DIAGNOSIS — R42 VERTIGO: Primary | ICD-10-CM

## 2024-05-07 PROCEDURE — 85652 RBC SED RATE AUTOMATED: CPT | Performed by: PHYSICIAN ASSISTANT

## 2024-05-07 PROCEDURE — 82306 VITAMIN D 25 HYDROXY: CPT | Performed by: PHYSICIAN ASSISTANT

## 2024-05-07 PROCEDURE — 36415 COLL VENOUS BLD VENIPUNCTURE: CPT | Performed by: PHYSICIAN ASSISTANT

## 2024-05-07 PROCEDURE — 1160F RVW MEDS BY RX/DR IN RCRD: CPT | Performed by: PHYSICIAN ASSISTANT

## 2024-05-07 PROCEDURE — 99213 OFFICE O/P EST LOW 20 MIN: CPT | Performed by: PHYSICIAN ASSISTANT

## 2024-05-07 PROCEDURE — 1159F MED LIST DOCD IN RCRD: CPT | Performed by: PHYSICIAN ASSISTANT

## 2024-05-07 PROCEDURE — 80050 GENERAL HEALTH PANEL: CPT | Performed by: PHYSICIAN ASSISTANT

## 2024-05-07 RX ORDER — MECLIZINE HYDROCHLORIDE 25 MG/1
25 TABLET ORAL 3 TIMES DAILY PRN
Qty: 30 TABLET | Refills: 0 | Status: SHIPPED | OUTPATIENT
Start: 2024-05-07

## 2024-05-08 DIAGNOSIS — E55.9 VITAMIN D DEFICIENCY: Primary | ICD-10-CM

## 2024-05-08 LAB
25(OH)D3 SERPL-MCNC: 15.7 NG/ML (ref 30–100)
ALBUMIN SERPL-MCNC: 4.8 G/DL (ref 3.5–5.2)
ALBUMIN/GLOB SERPL: 2.1 G/DL
ALP SERPL-CCNC: 138 U/L (ref 39–117)
ALT SERPL W P-5'-P-CCNC: 12 U/L (ref 1–41)
ANION GAP SERPL CALCULATED.3IONS-SCNC: 11 MMOL/L (ref 5–15)
AST SERPL-CCNC: 13 U/L (ref 1–40)
BASOPHILS # BLD AUTO: 0.02 10*3/MM3 (ref 0–0.2)
BASOPHILS NFR BLD AUTO: 0.3 % (ref 0–1.5)
BILIRUB SERPL-MCNC: 0.4 MG/DL (ref 0–1.2)
BUN SERPL-MCNC: 10 MG/DL (ref 6–20)
BUN/CREAT SERPL: 18.2 (ref 7–25)
CALCIUM SPEC-SCNC: 9.4 MG/DL (ref 8.6–10.5)
CHLORIDE SERPL-SCNC: 105 MMOL/L (ref 98–107)
CO2 SERPL-SCNC: 26 MMOL/L (ref 22–29)
CREAT SERPL-MCNC: 0.55 MG/DL (ref 0.76–1.27)
DEPRECATED RDW RBC AUTO: 41.8 FL (ref 37–54)
EGFRCR SERPLBLD CKD-EPI 2021: 144.6 ML/MIN/1.73
EOSINOPHIL # BLD AUTO: 0.07 10*3/MM3 (ref 0–0.4)
EOSINOPHIL NFR BLD AUTO: 1.1 % (ref 0.3–6.2)
ERYTHROCYTE [DISTWIDTH] IN BLOOD BY AUTOMATED COUNT: 13.1 % (ref 12.3–15.4)
ERYTHROCYTE [SEDIMENTATION RATE] IN BLOOD: 6 MM/HR (ref 0–15)
GLOBULIN UR ELPH-MCNC: 2.3 GM/DL
GLUCOSE SERPL-MCNC: 76 MG/DL (ref 65–99)
HCT VFR BLD AUTO: 49.1 % (ref 37.5–51)
HGB BLD-MCNC: 16.3 G/DL (ref 13–17.7)
IMM GRANULOCYTES # BLD AUTO: 0.01 10*3/MM3 (ref 0–0.05)
IMM GRANULOCYTES NFR BLD AUTO: 0.2 % (ref 0–0.5)
LYMPHOCYTES # BLD AUTO: 1.5 10*3/MM3 (ref 0.7–3.1)
LYMPHOCYTES NFR BLD AUTO: 24.1 % (ref 19.6–45.3)
MCH RBC QN AUTO: 29.2 PG (ref 26.6–33)
MCHC RBC AUTO-ENTMCNC: 33.2 G/DL (ref 31.5–35.7)
MCV RBC AUTO: 88 FL (ref 79–97)
MONOCYTES # BLD AUTO: 0.62 10*3/MM3 (ref 0.1–0.9)
MONOCYTES NFR BLD AUTO: 10 % (ref 5–12)
NEUTROPHILS NFR BLD AUTO: 4.01 10*3/MM3 (ref 1.7–7)
NEUTROPHILS NFR BLD AUTO: 64.3 % (ref 42.7–76)
NRBC BLD AUTO-RTO: 0 /100 WBC (ref 0–0.2)
PLATELET # BLD AUTO: 228 10*3/MM3 (ref 140–450)
PMV BLD AUTO: 10 FL (ref 6–12)
POTASSIUM SERPL-SCNC: 4.3 MMOL/L (ref 3.5–5.2)
PROT SERPL-MCNC: 7.1 G/DL (ref 6–8.5)
RBC # BLD AUTO: 5.58 10*6/MM3 (ref 4.14–5.8)
SODIUM SERPL-SCNC: 142 MMOL/L (ref 136–145)
TSH SERPL DL<=0.05 MIU/L-ACNC: 0.77 UIU/ML (ref 0.27–4.2)
WBC NRBC COR # BLD AUTO: 6.23 10*3/MM3 (ref 3.4–10.8)

## 2024-06-11 ENCOUNTER — APPOINTMENT (OUTPATIENT)
Dept: GENERAL RADIOLOGY | Facility: HOSPITAL | Age: 24
End: 2024-06-11
Payer: MEDICAID

## 2024-06-11 ENCOUNTER — HOSPITAL ENCOUNTER (EMERGENCY)
Facility: HOSPITAL | Age: 24
Discharge: HOME | End: 2024-06-11
Payer: MEDICAID

## 2024-06-11 VITALS
HEIGHT: 69 IN | RESPIRATION RATE: 18 BRPM | BODY MASS INDEX: 26.16 KG/M2 | HEART RATE: 70 BPM | OXYGEN SATURATION: 98 % | TEMPERATURE: 98.3 F | DIASTOLIC BLOOD PRESSURE: 71 MMHG | SYSTOLIC BLOOD PRESSURE: 122 MMHG | WEIGHT: 176.59 LBS
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
## 2024-06-11 DIAGNOSIS — S50.12XA CONTUSION OF LEFT FOREARM, INITIAL ENCOUNTER: ICD-10-CM

## 2024-06-11 PROCEDURE — 99282 EMERGENCY DEPT VISIT SF MDM: CPT
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
## 2024-06-11 NOTE — DISCHARGE INSTRUCTIONS
Tylenol or ibuprofen for discomfort.  Head injury precautions.  Follow-up with your primary care provider.  Return for new or worsening symptoms.

## 2024-06-11 NOTE — Clinical Note
[facility] EMERGENCY DEPARTMENT  [address] [place] [address]  Phone: [phone]    Jeffrey Gonzalez was seen and treated in our emergency department on 6/11/2024.  He may return to work on 06/13/2024.         Thank you for choosing [facility].    Ginny Landin RN

## 2024-06-11 NOTE — Clinical Note
[facility] EMERGENCY DEPARTMENT  [address] [place] [address]  Phone: [phone]    Jeffrey Gonzalez was seen and treated in our emergency department on 6/11/2024.  He may return to work on 06/13/2024.         Thank you for choosing [facility].    Ginny Landin RN

## 2024-06-11 NOTE — Clinical Note
[facility] EMERGENCY DEPARTMENT  [address] [place] [address]  Phone: [phone]    Jeffrey Gonzalez was seen and treated in our emergency department on 6/11/2024.  He may return to work on 06/13/2024.         Thank you for choosing [facility].    Ginny Landin RN

## 2024-06-12 ENCOUNTER — APPOINTMENT (OUTPATIENT)
Dept: GENERAL RADIOLOGY | Facility: HOSPITAL | Age: 24
End: 2024-06-12
Payer: MEDICAID

## 2024-06-12 ENCOUNTER — HOSPITAL ENCOUNTER (EMERGENCY)
Facility: HOSPITAL | Age: 24
Discharge: HOME | End: 2024-06-13
Attending: EMERGENCY MEDICINE
Payer: MEDICAID

## 2024-06-12 ENCOUNTER — APPOINTMENT (OUTPATIENT)
Dept: CT IMAGING | Facility: HOSPITAL | Age: 24
End: 2024-06-12
Payer: MEDICAID
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
## 2024-06-12 DIAGNOSIS — S09.90XA MINOR CLOSED HEAD INJURY: ICD-10-CM

## 2024-06-12 DIAGNOSIS — S06.0XAA CONCUSSION WITH UNKNOWN LOSS OF CONSCIOUSNESS STATUS, INITIAL ENCOUNTER: ICD-10-CM

## 2024-06-12 LAB
ALBUMIN SERPL-MCNC: 4.6 G/DL (ref 3.5–5.2)
ALBUMIN/GLOB SERPL: 2.3 G/DL
ALP SERPL-CCNC: 118 U/L (ref 39–117)
ALT SERPL W P-5'-P-CCNC: 9 U/L (ref 1–41)
AMPHET+METHAMPHET UR QL: NEGATIVE
AMPHETAMINES UR QL: NEGATIVE
ANION GAP SERPL CALCULATED.3IONS-SCNC: 9.4 MMOL/L (ref 5–15)
AST SERPL-CCNC: 14 U/L (ref 1–40)
BARBITURATES UR QL SCN: NEGATIVE
BASOPHILS # BLD AUTO: 0.04 10*3/MM3 (ref 0–0.2)
BASOPHILS NFR BLD AUTO: 0.7 % (ref 0–1.5)
BENZODIAZ UR QL SCN: NEGATIVE
BILIRUB SERPL-MCNC: 0.7 MG/DL (ref 0–1.2)
BILIRUB UR QL STRIP: NEGATIVE
BUN SERPL-MCNC: 10 MG/DL (ref 6–20)
BUN/CREAT SERPL: 15.9 (ref 7–25)
BUPRENORPHINE SERPL-MCNC: NEGATIVE NG/ML
CALCIUM SPEC-SCNC: 9.5 MG/DL (ref 8.6–10.5)
CANNABINOIDS SERPL QL: NEGATIVE
CHLORIDE SERPL-SCNC: 105 MMOL/L (ref 98–107)
CLARITY UR: ABNORMAL
CO2 SERPL-SCNC: 23.6 MMOL/L (ref 22–29)
COCAINE UR QL: NEGATIVE
COLOR UR: ABNORMAL
CREAT SERPL-MCNC: 0.63 MG/DL (ref 0.76–1.27)
DEPRECATED RDW RBC AUTO: 40 FL (ref 37–54)
EGFRCR SERPLBLD CKD-EPI 2021: 138.8 ML/MIN/1.73
EOSINOPHIL # BLD AUTO: 0.08 10*3/MM3 (ref 0–0.4)
EOSINOPHIL NFR BLD AUTO: 1.4 % (ref 0.3–6.2)
ERYTHROCYTE [DISTWIDTH] IN BLOOD BY AUTOMATED COUNT: 12.3 % (ref 12.3–15.4)
GLOBULIN UR ELPH-MCNC: 2 GM/DL
GLUCOSE SERPL-MCNC: 92 MG/DL (ref 65–99)
GLUCOSE UR STRIP-MCNC: NEGATIVE MG/DL
HCT VFR BLD AUTO: 48 % (ref 37.5–51)
HGB BLD-MCNC: 15.8 G/DL (ref 13–17.7)
HGB UR QL STRIP.AUTO: NEGATIVE
IMM GRANULOCYTES # BLD AUTO: 0.01 10*3/MM3 (ref 0–0.05)
IMM GRANULOCYTES NFR BLD AUTO: 0.2 % (ref 0–0.5)
KETONES UR QL STRIP: ABNORMAL
LEUKOCYTE ESTERASE UR QL STRIP.AUTO: NEGATIVE
LIPASE SERPL-CCNC: 23 U/L (ref 13–60)
LYMPHOCYTES # BLD AUTO: 1.65 10*3/MM3 (ref 0.7–3.1)
LYMPHOCYTES NFR BLD AUTO: 28.8 % (ref 19.6–45.3)
MCH RBC QN AUTO: 28.6 PG (ref 26.6–33)
MCHC RBC AUTO-ENTMCNC: 32.9 G/DL (ref 31.5–35.7)
MCV RBC AUTO: 87 FL (ref 79–97)
METHADONE UR QL SCN: NEGATIVE
MONOCYTES # BLD AUTO: 0.43 10*3/MM3 (ref 0.1–0.9)
MONOCYTES NFR BLD AUTO: 7.5 % (ref 5–12)
NEUTROPHILS NFR BLD AUTO: 3.52 10*3/MM3 (ref 1.7–7)
NEUTROPHILS NFR BLD AUTO: 61.4 % (ref 42.7–76)
NITRITE UR QL STRIP: NEGATIVE
OPIATES UR QL: NEGATIVE
OXYCODONE UR QL SCN: NEGATIVE
PCP UR QL SCN: NEGATIVE
PH UR STRIP.AUTO: 5.5 [PH] (ref 5–8)
PLATELET # BLD AUTO: 227 10*3/MM3 (ref 140–450)
PMV BLD AUTO: 9.5 FL (ref 6–12)
POTASSIUM SERPL-SCNC: 3.7 MMOL/L (ref 3.5–5.2)
PROT SERPL-MCNC: 6.6 G/DL (ref 6–8.5)
PROT UR QL STRIP: ABNORMAL
RBC # BLD AUTO: 5.52 10*6/MM3 (ref 4.14–5.8)
SODIUM SERPL-SCNC: 138 MMOL/L (ref 136–145)
SP GR UR STRIP: >=1.03 (ref 1–1.03)
TRICYCLICS UR QL SCN: NEGATIVE
UROBILINOGEN UR QL STRIP: ABNORMAL
WBC NRBC COR # BLD AUTO: 5.73 10*3/MM3 (ref 3.4–10.8)

## 2024-06-12 PROCEDURE — 96361 HYDRATE IV INFUSION ADD-ON: CPT

## 2024-06-12 PROCEDURE — 80053 COMPREHEN METABOLIC PANEL: CPT | Performed by: EMERGENCY MEDICINE

## 2024-06-12 PROCEDURE — 83690 ASSAY OF LIPASE: CPT | Performed by: EMERGENCY MEDICINE

## 2024-06-12 PROCEDURE — 73090 X-RAY EXAM OF FOREARM: CPT

## 2024-06-12 PROCEDURE — 70450 CT HEAD/BRAIN W/O DYE: CPT

## 2024-06-12 PROCEDURE — 81003 URINALYSIS AUTO W/O SCOPE: CPT | Performed by: EMERGENCY MEDICINE

## 2024-06-12 PROCEDURE — 99284 EMERGENCY DEPT VISIT MOD MDM: CPT | Performed by: EMERGENCY MEDICINE

## 2024-06-12 PROCEDURE — 80306 DRUG TEST PRSMV INSTRMNT: CPT | Performed by: EMERGENCY MEDICINE

## 2024-06-12 PROCEDURE — 85025 COMPLETE CBC W/AUTO DIFF WBC: CPT | Performed by: EMERGENCY MEDICINE

## 2024-06-12 PROCEDURE — 25810000003 SODIUM CHLORIDE 0.9 % SOLUTION: Performed by: EMERGENCY MEDICINE

## 2024-06-12 PROCEDURE — 99284 EMERGENCY DEPT VISIT MOD MDM: CPT

## 2024-06-12 RX ORDER — KETOROLAC TROMETHAMINE 30 MG/ML
15 INJECTION, SOLUTION INTRAMUSCULAR; INTRAVENOUS ONCE
Status: COMPLETED | OUTPATIENT
Start: 2024-06-13 | End: 2024-06-13

## 2024-06-12 RX ADMIN — SODIUM CHLORIDE 1000 ML: 9 INJECTION, SOLUTION INTRAVENOUS at 23:21

## 2024-06-12 RX ADMIN — SODIUM CHLORIDE 1000 ML: 9 INJECTION, SOLUTION INTRAVENOUS at 22:16

## 2024-06-12 NOTE — Clinical Note
[facility] FSED [place] [address] [place] [address] [place] [address]  Phone: [phone]    Jeffrey Gonzalez was seen and treated in our emergency department on 6/12/2024.  He may return to work on 06/14/2024.         Thank you for choosing [facility].    Melissa Beach MD

## 2024-06-13 ENCOUNTER — OFFICE VISIT (OUTPATIENT)
Dept: FAMILY MEDICINE CLINIC | Facility: CLINIC | Age: 24
End: 2024-06-13
Payer: MEDICAID

## 2024-06-13 VITALS
HEIGHT: 69 IN | OXYGEN SATURATION: 96 % | WEIGHT: 177.2 LBS | TEMPERATURE: 97.7 F | DIASTOLIC BLOOD PRESSURE: 74 MMHG | SYSTOLIC BLOOD PRESSURE: 120 MMHG | HEART RATE: 60 BPM | BODY MASS INDEX: 26.25 KG/M2

## 2024-06-13 VITALS
OXYGEN SATURATION: 100 % | TEMPERATURE: 98 F | SYSTOLIC BLOOD PRESSURE: 119 MMHG | BODY MASS INDEX: 26.16 KG/M2 | WEIGHT: 176.59 LBS | HEART RATE: 55 BPM | DIASTOLIC BLOOD PRESSURE: 68 MMHG | RESPIRATION RATE: 16 BRPM | HEIGHT: 69 IN

## 2024-06-13 DIAGNOSIS — R41.3 MEMORY LOSS: ICD-10-CM

## 2024-06-13 DIAGNOSIS — S06.0X1D CONCUSSION WITH LOSS OF CONSCIOUSNESS OF 30 MINUTES OR LESS, SUBSEQUENT ENCOUNTER: ICD-10-CM
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
## 2024-06-13 PROCEDURE — 25010000002 KETOROLAC TROMETHAMINE PER 15 MG: Performed by: EMERGENCY MEDICINE

## 2024-06-13 PROCEDURE — 96374 THER/PROPH/DIAG INJ IV PUSH: CPT

## 2024-06-13 PROCEDURE — 1160F RVW MEDS BY RX/DR IN RCRD: CPT

## 2024-06-13 PROCEDURE — 99214 OFFICE O/P EST MOD 30 MIN: CPT

## 2024-06-13 PROCEDURE — 1159F MED LIST DOCD IN RCRD: CPT

## 2024-06-13 RX ORDER — ONDANSETRON 4 MG/1
4 TABLET, ORALLY DISINTEGRATING ORAL EVERY 8 HOURS PRN
Qty: 12 TABLET | Refills: 0 | Status: SHIPPED | OUTPATIENT
Start: 2024-06-13

## 2024-06-13 RX ORDER — DICLOFENAC SODIUM 75 MG/1
75 TABLET, DELAYED RELEASE ORAL 2 TIMES DAILY
Qty: 15 TABLET | Refills: 0 | Status: SHIPPED | OUTPATIENT
Start: 2024-06-13

## 2024-06-13 RX ADMIN — KETOROLAC TROMETHAMINE 15 MG: 30 INJECTION, SOLUTION INTRAMUSCULAR at 00:07

## 2024-06-13 NOTE — LETTER
June 13, 2024     Patient: Jeffrey Gonzalez   YOB: 2000   Date of Visit: 6/13/2024       To Whom It May Concern:    It is my medical opinion that Jeffrey Gonzalez may return to work 06/14/2023. He also requires extra bathroom breaks for a medical condition.          Sincerely,        ATIYA Dickson    CC: No Recipients
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
## 2024-06-13 NOTE — LETTER
June 13, 2024     Patient: Jeffrey Gonzalez   YOB: 2000   Date of Visit: 6/13/2024       To Whom It May Concern:    It is my medical opinion that Jeffrey Gonzalez needs more bathroom breaks because of a medical condition.            Sincerely,        ATIYA Dickson    CC: No Recipients
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
## 2024-06-13 NOTE — DISCHARGE INSTRUCTIONS
Drink plenty of fluids. Rest. Take medication as prescribed. Do not drive or operate heavy machinery until cleared by Neurology or your PCP. Return if problems.

## 2024-06-13 NOTE — Clinical Note
June 13, 2024     Patient: Jeffrey Gonzalez   YOB: 2000   Date of Visit: 6/13/2024       To Whom it May Concern:    Jeffrey Gonzalez was seen in my clinic on 6/13/2024. He  may return to school in one day.           Sincerely,          ATIYA Dickson        CC: No Recipients

## 2024-06-21 ENCOUNTER — TELEPHONE (OUTPATIENT)
Dept: FAMILY MEDICINE CLINIC | Facility: CLINIC | Age: 24
End: 2024-06-21
Payer: MEDICAID

## 2024-06-21 ENCOUNTER — OFFICE VISIT (OUTPATIENT)
Dept: FAMILY MEDICINE CLINIC | Facility: CLINIC | Age: 24
End: 2024-06-21
Payer: MEDICAID

## 2024-06-21 VITALS
HEART RATE: 66 BPM | TEMPERATURE: 98.4 F | SYSTOLIC BLOOD PRESSURE: 124 MMHG | WEIGHT: 171.8 LBS | DIASTOLIC BLOOD PRESSURE: 74 MMHG | OXYGEN SATURATION: 99 % | BODY MASS INDEX: 25.45 KG/M2 | HEIGHT: 69 IN

## 2024-06-21 DIAGNOSIS — R29.898 RIGHT ARM WEAKNESS: ICD-10-CM

## 2024-06-21 DIAGNOSIS — M54.2 NECK PAIN: ICD-10-CM

## 2024-06-21 DIAGNOSIS — R40.20 LOSS OF CONSCIOUSNESS: Primary | ICD-10-CM

## 2024-06-21 DIAGNOSIS — R11.2 NAUSEA AND VOMITING, UNSPECIFIED VOMITING TYPE: ICD-10-CM

## 2024-06-21 DIAGNOSIS — G44.319 ACUTE POST-TRAUMATIC HEADACHE, NOT INTRACTABLE: ICD-10-CM

## 2024-06-21 PROCEDURE — 99214 OFFICE O/P EST MOD 30 MIN: CPT

## 2024-06-21 PROCEDURE — 1159F MED LIST DOCD IN RCRD: CPT

## 2024-06-21 PROCEDURE — 1160F RVW MEDS BY RX/DR IN RCRD: CPT
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
## 2024-06-21 NOTE — TELEPHONE ENCOUNTER
Caller: NILAY BELLA    Relationship: Mother    Best call back number:  [phone] (Mobile)       What was the call regarding:  PATIENT IS STILL HAVING ISSUES GETTING HIS APPT FOR NEUROLOGY (REFERRAL )     Is it okay if the provider responds through [employer]:     CAN YOU CALL THEM AND DISCUSS THE ISSUES       CANNOT GET APPT UNTIL AUGUST

## 2024-06-22 ENCOUNTER — HOSPITAL ENCOUNTER (EMERGENCY)
Facility: HOSPITAL | Age: 24
Discharge: HOME | End: 2024-06-22
Attending: EMERGENCY MEDICINE
Payer: MEDICAID

## 2024-06-22 VITALS
SYSTOLIC BLOOD PRESSURE: 123 MMHG | OXYGEN SATURATION: 95 % | BODY MASS INDEX: 25.91 KG/M2 | DIASTOLIC BLOOD PRESSURE: 86 MMHG | HEIGHT: 68 IN | TEMPERATURE: 97.9 F | RESPIRATION RATE: 18 BRPM | WEIGHT: 171 LBS | HEART RATE: 97 BPM

## 2024-06-22 DIAGNOSIS — T67.1XXD HEAT SYNCOPE, SUBSEQUENT ENCOUNTER: ICD-10-CM

## 2024-06-22 DIAGNOSIS — T67.5XXA HEAT EXHAUSTION, INITIAL ENCOUNTER: Primary | ICD-10-CM

## 2024-06-22 LAB
ANION GAP SERPL CALCULATED.3IONS-SCNC: 11.7 MMOL/L (ref 5–15)
BASOPHILS # BLD AUTO: 0.02 10*3/MM3 (ref 0–0.2)
BASOPHILS NFR BLD AUTO: 0.5 % (ref 0–1.5)
BILIRUB UR QL STRIP: NEGATIVE
BUN SERPL-MCNC: 9 MG/DL (ref 6–20)
BUN/CREAT SERPL: 13.6 (ref 7–25)
CALCIUM SPEC-SCNC: 9.5 MG/DL (ref 8.6–10.5)
CHLORIDE SERPL-SCNC: 105 MMOL/L (ref 98–107)
CLARITY UR: CLEAR
CO2 SERPL-SCNC: 24.3 MMOL/L (ref 22–29)
COLOR UR: YELLOW
CREAT SERPL-MCNC: 0.66 MG/DL (ref 0.76–1.27)
DEPRECATED RDW RBC AUTO: 39.6 FL (ref 37–54)
EGFRCR SERPLBLD CKD-EPI 2021: 136.9 ML/MIN/1.73
EOSINOPHIL # BLD AUTO: 0.09 10*3/MM3 (ref 0–0.4)
EOSINOPHIL NFR BLD AUTO: 2.3 % (ref 0.3–6.2)
ERYTHROCYTE [DISTWIDTH] IN BLOOD BY AUTOMATED COUNT: 12.3 % (ref 12.3–15.4)
GLUCOSE BLDC GLUCOMTR-MCNC: 105 MG/DL (ref 70–130)
GLUCOSE SERPL-MCNC: 97 MG/DL (ref 65–99)
GLUCOSE UR STRIP-MCNC: NEGATIVE MG/DL
HCT VFR BLD AUTO: 48.2 % (ref 37.5–51)
HGB BLD-MCNC: 15.6 G/DL (ref 13–17.7)
HGB UR QL STRIP.AUTO: NEGATIVE
IMM GRANULOCYTES # BLD AUTO: 0 10*3/MM3 (ref 0–0.05)
IMM GRANULOCYTES NFR BLD AUTO: 0 % (ref 0–0.5)
KETONES UR QL STRIP: NEGATIVE
LEUKOCYTE ESTERASE UR QL STRIP.AUTO: NEGATIVE
LYMPHOCYTES # BLD AUTO: 1.52 10*3/MM3 (ref 0.7–3.1)
LYMPHOCYTES NFR BLD AUTO: 38.5 % (ref 19.6–45.3)
MCH RBC QN AUTO: 28.1 PG (ref 26.6–33)
MCHC RBC AUTO-ENTMCNC: 32.4 G/DL (ref 31.5–35.7)
MCV RBC AUTO: 86.8 FL (ref 79–97)
MONOCYTES # BLD AUTO: 0.34 10*3/MM3 (ref 0.1–0.9)
MONOCYTES NFR BLD AUTO: 8.6 % (ref 5–12)
NEUTROPHILS NFR BLD AUTO: 1.98 10*3/MM3 (ref 1.7–7)
NEUTROPHILS NFR BLD AUTO: 50.1 % (ref 42.7–76)
NITRITE UR QL STRIP: NEGATIVE
PH UR STRIP.AUTO: 5.5 [PH] (ref 5–8)
PLATELET # BLD AUTO: 205 10*3/MM3 (ref 140–450)
PMV BLD AUTO: 9.1 FL (ref 6–12)
POTASSIUM SERPL-SCNC: 3.9 MMOL/L (ref 3.5–5.2)
PROT UR QL STRIP: NEGATIVE
RBC # BLD AUTO: 5.55 10*6/MM3 (ref 4.14–5.8)
SODIUM SERPL-SCNC: 141 MMOL/L (ref 136–145)
SP GR UR STRIP: >=1.03 (ref 1–1.03)
UROBILINOGEN UR QL STRIP: NORMAL
WBC NRBC COR # BLD AUTO: 3.95 10*3/MM3 (ref 3.4–10.8)

## 2024-06-22 PROCEDURE — 81003 URINALYSIS AUTO W/O SCOPE: CPT | Performed by: EMERGENCY MEDICINE

## 2024-06-22 PROCEDURE — 80048 BASIC METABOLIC PNL TOTAL CA: CPT | Performed by: EMERGENCY MEDICINE

## 2024-06-22 PROCEDURE — 93005 ELECTROCARDIOGRAM TRACING: CPT | Performed by: EMERGENCY MEDICINE

## 2024-06-22 PROCEDURE — 99283 EMERGENCY DEPT VISIT LOW MDM: CPT

## 2024-06-22 PROCEDURE — 85025 COMPLETE CBC W/AUTO DIFF WBC: CPT | Performed by: EMERGENCY MEDICINE

## 2024-06-22 PROCEDURE — 82948 REAGENT STRIP/BLOOD GLUCOSE: CPT

## 2024-06-22 PROCEDURE — 99284 EMERGENCY DEPT VISIT MOD MDM: CPT | Performed by: EMERGENCY MEDICINE

## 2024-06-22 PROCEDURE — 93010 ELECTROCARDIOGRAM REPORT: CPT | Performed by: EMERGENCY MEDICINE

## 2024-06-22 PROCEDURE — 25810000003 SODIUM CHLORIDE 0.9 % SOLUTION: Performed by: EMERGENCY MEDICINE

## 2024-06-22 RX ADMIN — SODIUM CHLORIDE 1000 ML: 0.9 INJECTION, SOLUTION INTRAVENOUS at 11:10

## 2024-06-22 NOTE — Clinical Note
[facility] [place] [address] [place] [address] [place] [address]  Phone: [phone]    Jeffrey Gonzalez was seen and treated in our emergency department on 6/22/2024.  He may return to work on 06/25/2024.         Thank you for choosing [facility].    Xavier Zelaya MD

## 2024-06-22 NOTE — FSED PROVIDER NOTE
Subjective   History of Present Illness  Patient with complaint of passing out at work 2 days ago. No pattern to symptoms. No precipitating or relieving factors. Patient without complaints today. Patient stated the work environment was very hot. No vomiting, lightheadedness or dizziness. No other complaints. No chest pain, shortness of breath, abdominal pain.     History provided by:  Patient and parent   used: No        Review of Systems   All other systems reviewed and are negative.      Past Medical History:   Diagnosis Date    Anemia     Anxiety     Asthma     Chronic diarrhea     Depression     GERD (gastroesophageal reflux disease) 2021    Irritable bowel syndrome 2021       No Known Allergies    Past Surgical History:   Procedure Laterality Date    COLONOSCOPY  2021    TONSILLECTOMY  2003    UPPER GASTROINTESTINAL ENDOSCOPY  2021       Family History   Problem Relation Age of Onset    Ulcerative colitis Maternal Grandmother     Asthma Maternal Grandmother     Cancer Maternal Grandmother     COPD Maternal Grandmother     Colon cancer Maternal Grandfather     Liver cancer Maternal Grandfather     Cancer Maternal Grandfather     COPD Maternal Grandfather     Early death Maternal Grandfather     Heart disease Maternal Grandfather     Anxiety disorder Mother     Asthma Mother     Depression Mother     Alcohol abuse Father     Mental illness Father     Anxiety disorder Sister     Asthma Sister     Depression Sister     Developmental Disability Sister     Learning disabilities Sister     Mental illness Sister     Anxiety disorder Sister     Asthma Sister     Depression Sister     Developmental Disability Sister     Learning disabilities Sister     Mental illness Sister     Anxiety disorder Brother     Depression Brother     Drug abuse Maternal Uncle     Drug abuse Maternal Uncle     Heart disease Maternal Uncle        Social History     Socioeconomic History    Marital status: Single   Tobacco  Use    Smoking status: Never     Passive exposure: Never    Smokeless tobacco: Never   Vaping Use    Vaping status: Never Used   Substance and Sexual Activity    Alcohol use: Not Currently    Drug use: Not Currently    Sexual activity: Not Currently     Partners: Female     Birth control/protection: Condom           Objective   Physical Exam  Vitals and nursing note reviewed.   Constitutional:       Appearance: Normal appearance.   HENT:      Head: Normocephalic.      Nose: Nose normal.      Mouth/Throat:      Mouth: Mucous membranes are moist.   Eyes:      Pupils: Pupils are equal, round, and reactive to light.   Cardiovascular:      Rate and Rhythm: Normal rate and regular rhythm.      Pulses: Normal pulses.      Heart sounds: Normal heart sounds.   Pulmonary:      Effort: Pulmonary effort is normal.      Breath sounds: Normal breath sounds.   Abdominal:      General: Abdomen is flat.      Palpations: Abdomen is soft.   Musculoskeletal:         General: Normal range of motion.      Cervical back: Normal range of motion and neck supple.   Skin:     General: Skin is warm and dry.      Capillary Refill: Capillary refill takes less than 2 seconds.   Neurological:      General: No focal deficit present.      Mental Status: He is alert and oriented to person, place, and time.   Psychiatric:         Mood and Affect: Mood normal.         ECG 12 Lead Chest Pain      Date/Time: 6/22/2024 11:00 AM    Performed by: Xavier Zelaya MD  Authorized by: Mily Sorto APRN  Interpreted by ED physician  Comparison: not compared with previous ECG   Previous ECG: no previous ECG available  Rhythm: sinus rhythm  Rate: normal  BPM: 62  QRS axis: normal  Conduction: conduction normal  ST Segments: ST segments normal  Other: no other findings  Clinical impression: normal ECG               ED Course                                           Medical Decision Making  Concern for blood dyscrasias, metabolic derangement, syncope. D/w  patient and mother. Agree wti hplan.    Problems Addressed:  Heat exhaustion, initial encounter: complicated acute illness or injury  Heat syncope, subsequent encounter: complicated acute illness or injury    Amount and/or Complexity of Data Reviewed  Labs: ordered.  ECG/medicine tests: ordered.        Final diagnoses:   Heat exhaustion, initial encounter   Heat syncope, subsequent encounter       ED Disposition  ED Disposition       ED Disposition   Discharge    Condition   Stable    Comment   --               Sharmaine Banda PA  [address] [address]  [phone]    Schedule an appointment as soon as possible for a visit            Medication List        Stop      cetirizine 10 MG tablet  Commonly known as: zyrTEC

## 2024-06-24 LAB
QT INTERVAL: 382 MS
QTC INTERVAL: 387 MS

## 2025-02-17 ENCOUNTER — OFFICE VISIT (OUTPATIENT)
Dept: FAMILY MEDICINE CLINIC | Facility: CLINIC | Age: 25
End: 2025-02-17
Payer: MEDICAID

## 2025-02-17 VITALS
OXYGEN SATURATION: 96 % | BODY MASS INDEX: 28.49 KG/M2 | WEIGHT: 188 LBS | SYSTOLIC BLOOD PRESSURE: 125 MMHG | TEMPERATURE: 99.8 F | HEIGHT: 68 IN | HEART RATE: 84 BPM | DIASTOLIC BLOOD PRESSURE: 77 MMHG

## 2025-02-17 DIAGNOSIS — R07.81 PLEURITIC PAIN: ICD-10-CM

## 2025-02-17 DIAGNOSIS — H57.11 ACUTE RIGHT EYE PAIN: Primary | ICD-10-CM

## 2025-02-17 PROCEDURE — 99214 OFFICE O/P EST MOD 30 MIN: CPT | Performed by: NURSE PRACTITIONER

## 2025-02-17 RX ORDER — NAPROXEN 500 MG/1
500 TABLET ORAL 2 TIMES DAILY PRN
Qty: 60 TABLET | Refills: 0 | Status: SHIPPED | OUTPATIENT
Start: 2025-02-17
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
## 2025-05-01 ENCOUNTER — OFFICE VISIT (OUTPATIENT)
Dept: FAMILY MEDICINE CLINIC | Facility: CLINIC | Age: 25
End: 2025-05-01
Payer: MEDICAID

## 2025-05-01 VITALS
WEIGHT: 186 LBS | HEIGHT: 68 IN | OXYGEN SATURATION: 96 % | BODY MASS INDEX: 28.19 KG/M2 | TEMPERATURE: 98.7 F | SYSTOLIC BLOOD PRESSURE: 126 MMHG | HEART RATE: 91 BPM | DIASTOLIC BLOOD PRESSURE: 78 MMHG

## 2025-05-01 DIAGNOSIS — H93.13 TINNITUS OF BOTH EARS: ICD-10-CM

## 2025-05-01 DIAGNOSIS — R51.9 NONINTRACTABLE HEADACHE, UNSPECIFIED CHRONICITY PATTERN, UNSPECIFIED HEADACHE TYPE: Primary | ICD-10-CM

## 2025-05-01 DIAGNOSIS — R07.9 CHEST PAIN, UNSPECIFIED TYPE: ICD-10-CM

## 2025-05-01 DIAGNOSIS — R42 DIZZINESS: ICD-10-CM

## 2025-05-01 PROCEDURE — 93000 ELECTROCARDIOGRAM COMPLETE: CPT | Performed by: FAMILY MEDICINE

## 2025-05-01 PROCEDURE — 99214 OFFICE O/P EST MOD 30 MIN: CPT | Performed by: FAMILY MEDICINE
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
## 2025-05-02 ENCOUNTER — LAB (OUTPATIENT)
Dept: FAMILY MEDICINE CLINIC | Facility: CLINIC | Age: 25
End: 2025-05-02
Payer: MEDICAID

## 2025-05-02 DIAGNOSIS — R42 DIZZINESS: ICD-10-CM

## 2025-05-02 DIAGNOSIS — K21.9 GASTROESOPHAGEAL REFLUX DISEASE WITHOUT ESOPHAGITIS: ICD-10-CM

## 2025-05-02 LAB
ANION GAP SERPL CALCULATED.3IONS-SCNC: 12 MMOL/L (ref 5–15)
BASOPHILS # BLD AUTO: 0.04 10*3/MM3 (ref 0–0.2)
BASOPHILS NFR BLD AUTO: 0.9 % (ref 0–1.5)
BUN SERPL-MCNC: 12 MG/DL (ref 6–20)
BUN/CREAT SERPL: 18.5 (ref 7–25)
CALCIUM SPEC-SCNC: 9.1 MG/DL (ref 8.6–10.5)
CHLORIDE SERPL-SCNC: 104 MMOL/L (ref 98–107)
CO2 SERPL-SCNC: 24 MMOL/L (ref 22–29)
CREAT SERPL-MCNC: 0.65 MG/DL (ref 0.76–1.27)
DEPRECATED RDW RBC AUTO: 40.5 FL (ref 37–54)
EGFRCR SERPLBLD CKD-EPI 2021: 136.6 ML/MIN/1.73
EOSINOPHIL # BLD AUTO: 0.13 10*3/MM3 (ref 0–0.4)
EOSINOPHIL NFR BLD AUTO: 2.9 % (ref 0.3–6.2)
ERYTHROCYTE [DISTWIDTH] IN BLOOD BY AUTOMATED COUNT: 12.5 % (ref 12.3–15.4)
GLUCOSE SERPL-MCNC: 92 MG/DL (ref 65–99)
HCT VFR BLD AUTO: 47.2 % (ref 37.5–51)
HGB BLD-MCNC: 15.5 G/DL (ref 13–17.7)
IMM GRANULOCYTES # BLD AUTO: 0.01 10*3/MM3 (ref 0–0.05)
IMM GRANULOCYTES NFR BLD AUTO: 0.2 % (ref 0–0.5)
LYMPHOCYTES # BLD AUTO: 2.04 10*3/MM3 (ref 0.7–3.1)
LYMPHOCYTES NFR BLD AUTO: 45.2 % (ref 19.6–45.3)
MCH RBC QN AUTO: 29 PG (ref 26.6–33)
MCHC RBC AUTO-ENTMCNC: 32.8 G/DL (ref 31.5–35.7)
MCV RBC AUTO: 88.2 FL (ref 79–97)
MONOCYTES # BLD AUTO: 0.34 10*3/MM3 (ref 0.1–0.9)
MONOCYTES NFR BLD AUTO: 7.5 % (ref 5–12)
NEUTROPHILS NFR BLD AUTO: 1.95 10*3/MM3 (ref 1.7–7)
NEUTROPHILS NFR BLD AUTO: 43.3 % (ref 42.7–76)
NRBC BLD AUTO-RTO: 0 /100 WBC (ref 0–0.2)
PLATELET # BLD AUTO: 234 10*3/MM3 (ref 140–450)
PMV BLD AUTO: 9.8 FL (ref 6–12)
POTASSIUM SERPL-SCNC: 3.7 MMOL/L (ref 3.5–5.2)
RBC # BLD AUTO: 5.35 10*6/MM3 (ref 4.14–5.8)
SODIUM SERPL-SCNC: 140 MMOL/L (ref 136–145)
WBC NRBC COR # BLD AUTO: 4.51 10*3/MM3 (ref 3.4–10.8)

## 2025-05-02 PROCEDURE — 85025 COMPLETE CBC W/AUTO DIFF WBC: CPT | Performed by: FAMILY MEDICINE

## 2025-05-02 PROCEDURE — 36415 COLL VENOUS BLD VENIPUNCTURE: CPT

## 2025-05-02 PROCEDURE — 80048 BASIC METABOLIC PNL TOTAL CA: CPT | Performed by: FAMILY MEDICINE

## 2025-05-02 RX ORDER — PANTOPRAZOLE SODIUM 40 MG/1
40 TABLET, DELAYED RELEASE ORAL DAILY
Qty: 90 TABLET | Refills: 0 | Status: SHIPPED | OUTPATIENT
Start: 2025-05-02

## 2025-05-28 ENCOUNTER — HOSPITAL ENCOUNTER (EMERGENCY)
Facility: HOSPITAL | Age: 25
Discharge: HOME | End: 2025-05-28
Attending: EMERGENCY MEDICINE | Admitting: EMERGENCY MEDICINE
Payer: MEDICAID

## 2025-05-28 VITALS
WEIGHT: 194.1 LBS | DIASTOLIC BLOOD PRESSURE: 79 MMHG | BODY MASS INDEX: 29.42 KG/M2 | HEIGHT: 68 IN | OXYGEN SATURATION: 97 % | RESPIRATION RATE: 18 BRPM | SYSTOLIC BLOOD PRESSURE: 144 MMHG | TEMPERATURE: 98.8 F | HEART RATE: 70 BPM

## 2025-05-28 DIAGNOSIS — M79.662 PAIN OF LEFT CALF: Primary | ICD-10-CM

## 2025-05-28 LAB — D DIMER PPP FEU-MCNC: 0.19 MCGFEU/ML (ref 0–0.5)

## 2025-05-28 PROCEDURE — 99283 EMERGENCY DEPT VISIT LOW MDM: CPT

## 2025-05-28 PROCEDURE — 36415 COLL VENOUS BLD VENIPUNCTURE: CPT

## 2025-05-28 PROCEDURE — 85379 FIBRIN DEGRADATION QUANT: CPT | Performed by: NURSE PRACTITIONER

## 2025-05-28 PROCEDURE — 99282 EMERGENCY DEPT VISIT SF MDM: CPT | Performed by: NURSE PRACTITIONER

## 2025-05-28 NOTE — DISCHARGE INSTRUCTIONS
Call for a follow up appointment with your primary care for for reevaluation and further treatment.    Tylenol/Motrin as needed for pain/fevers    Make sure patient is drinking plenty of fluids.    Return for any new or worsening symptoms, such as ankle swelling, calf redness or swelling, increased tenderness in your calf    Voice recognition transcription technology was used for documentation on this chart.  Result there may be some typos and/or introduced into the chart that were overlooked during editing reviewing.

## 2025-05-28 NOTE — Clinical Note
[facility] [place] [address] [place] [address] [place] [address]  Phone: [phone]    Jeffrey Gonzalez was seen and treated in our emergency department on 5/28/2025.  He may return to work on 05/29/2025.  If patient develops impurities pain in his calf, ankle swelling he needs to be reevaluated by primary care before returning to work       Thank you for choosing [facility].    Lynn Long APRN

## 2025-05-28 NOTE — FSED PROVIDER NOTE
Subjective   History of Present Illness  The patient is a 24-year-old male who presents to the ER with pain in the left lower extremity since this morning.  [facility] point,  reports that he was told to come here for a DVT workup.  Patient denies injury or trauma to the LLE at this time. Patient denies any long trips or flights. Denies sitting or long periods of time.  Patient reports that he did do some stretching prior to the pain starting.    History provided by:  Patient   used: No        Review of Systems   Musculoskeletal:         LLE pain.        Past Medical History:   Diagnosis Date    Anemia     Anxiety     Asthma     Chronic diarrhea     Depression     GERD (gastroesophageal reflux disease) 2021    Irritable bowel syndrome 2021       No Known Allergies    Past Surgical History:   Procedure Laterality Date    COLONOSCOPY  2021    TONSILLECTOMY  2003    UPPER GASTROINTESTINAL ENDOSCOPY  2021       Family History   Problem Relation Age of Onset    Ulcerative colitis Maternal Grandmother     Asthma Maternal Grandmother     Cancer Maternal Grandmother     COPD Maternal Grandmother     Colon cancer Maternal Grandfather     Liver cancer Maternal Grandfather     Cancer Maternal Grandfather     COPD Maternal Grandfather     Early death Maternal Grandfather     Heart disease Maternal Grandfather     Anxiety disorder Mother     Asthma Mother     Depression Mother     Alcohol abuse Father     Mental illness Father     Anxiety disorder Sister     Asthma Sister     Depression Sister     Developmental Disability Sister     Learning disabilities Sister     Mental illness Sister     Anxiety disorder Sister     Asthma Sister     Depression Sister     Developmental Disability Sister     Learning disabilities Sister     Mental illness Sister     Anxiety disorder Brother     Depression Brother     Drug abuse Maternal Uncle     Drug abuse Maternal Uncle      Heart disease Maternal Uncle        Social History     Socioeconomic History    Marital status: Single   Tobacco Use    Smoking status: Never     Passive exposure: Never    Smokeless tobacco: Never   Vaping Use    Vaping status: Never Used   Substance and Sexual Activity    Alcohol use: Not Currently    Drug use: Not Currently    Sexual activity: Not Currently     Partners: Female     Birth control/protection: Condom           Objective   Physical Exam  Vitals and nursing note reviewed.   Constitutional:       Appearance: Normal appearance.   HENT:      Head: Normocephalic.      Right Ear: Tympanic membrane and ear canal normal.      Left Ear: Tympanic membrane and ear canal normal.      Nose: Nose normal.      Mouth/Throat:      Lips: Pink.      Pharynx: Oropharynx is clear. Uvula midline.   Musculoskeletal:         General: Tenderness present.      Comments: Patient with LLE pain and tenderness noted to the calf area. Patient with positive pedal and post tibial pulses noted. There is no redness, erythema, or swelling.    Skin:     General: Skin is warm and dry.   Neurological:      General: No focal deficit present.      Mental Status: He is alert and oriented to person, place, and time.   Psychiatric:         Mood and Affect: Mood normal.         Behavior: Behavior normal. Behavior is cooperative.         Procedures           ED Course  ED Course as of 05/29/25 1015   Wed May 28, 2025   1919 D-Dimer, Quant: 0.19 [DS]      ED Course User Index  [DS] Lynn Long APRN                                           Medical Decision Making  The patient is a 24-year-old male who presents to the ER with pain in the left lower extremity since this morning.  Patient reports he was at [facility] urgent care [facility] point,  reports that he was told to come here for a DVT workup.  Patient denies injury or trauma to the LLE at this time. Patient denies any long trips or flights. Denies sitting or long periods  of time.  Patient reports that he did do some stretching prior to the pain starting.  Differential diagnosis includes but not limited to muscle strain, DVT.  D-dimer 0.19.  Do not suspect patient has DVT at this time.  Patient has no swelling, or erythema of the calf advised patient to follow-up with primary care for further evaluation and treatment as needed.  Patient given strict return precautions.    Dr. Van, advises since patient is low risk to do Ddimer, if negative, follow up with PCP.       Problems Addressed:  Pain of left calf: acute illness or injury    Amount and/or Complexity of Data Reviewed  Labs:  Decision-making details documented in ED Course.    Risk  OTC drugs.        Final diagnoses:   Pain of left calf       ED Disposition  ED Disposition       ED Disposition   Discharge    Condition   Stable    Comment   --               Sharmaine Banda PA  [facility] [place] [id number] [phone]    Schedule an appointment as soon as possible for a visit in 1 week  As needed, If symptoms worsen         Medication List      No changes were made to your prescriptions during this visit.

## 2025-05-28 NOTE — Clinical Note
[facility] [place] [address] [place] [address] [place] [address]  Phone: [phone]    Jeffrey Gonzalez was seen and treated in our emergency department on 5/28/2025.  He may return to work on 05/29/2025.  If patient develops impurities pain in his calf, ankle swelling he needs to be reevaluated by primary care before returning to work       Thank you for choosing [facility].    Lynn Long APRN

## 2025-07-10 ENCOUNTER — APPOINTMENT (OUTPATIENT)
Dept: GENERAL RADIOLOGY | Facility: HOSPITAL | Age: 25
End: 2025-07-10
Payer: MEDICAID

## 2025-07-10 ENCOUNTER — HOSPITAL ENCOUNTER (OUTPATIENT)
Facility: HOSPITAL | Age: 25
Discharge: HOME | End: 2025-07-10
Attending: EMERGENCY MEDICINE | Admitting: EMERGENCY MEDICINE
Payer: MEDICAID

## 2025-07-10 VITALS
BODY MASS INDEX: 29.55 KG/M2 | HEIGHT: 68 IN | SYSTOLIC BLOOD PRESSURE: 143 MMHG | OXYGEN SATURATION: 98 % | DIASTOLIC BLOOD PRESSURE: 90 MMHG | TEMPERATURE: 97.9 F | HEART RATE: 86 BPM | RESPIRATION RATE: 20 BRPM | WEIGHT: 195 LBS

## 2025-07-10 DIAGNOSIS — R07.9 CHEST PAIN, UNSPECIFIED TYPE: Primary | ICD-10-CM

## 2025-07-10 LAB
AMPHET+METHAMPHET UR QL: NEGATIVE
AMPHETAMINES UR QL: NEGATIVE
BARBITURATES UR QL SCN: NEGATIVE
BENZODIAZ UR QL SCN: NEGATIVE
BILIRUB UR QL STRIP: NEGATIVE
BUPRENORPHINE SERPL-MCNC: NEGATIVE NG/ML
CANNABINOIDS SERPL QL: NEGATIVE
CLARITY UR: ABNORMAL
COCAINE UR QL: NEGATIVE
COLOR UR: YELLOW
GLUCOSE UR STRIP-MCNC: NEGATIVE MG/DL
HGB UR QL STRIP.AUTO: ABNORMAL
KETONES UR QL STRIP: NEGATIVE
LEUKOCYTE ESTERASE UR QL STRIP.AUTO: NEGATIVE
METHADONE UR QL SCN: NEGATIVE
NITRITE UR QL STRIP: NEGATIVE
OPIATES UR QL: NEGATIVE
OXYCODONE UR QL SCN: NEGATIVE
PCP UR QL SCN: NEGATIVE
PH UR STRIP.AUTO: 5.5 [PH] (ref 5–8)
PROT UR QL STRIP: ABNORMAL
QT INTERVAL: 357 MS
QTC INTERVAL: 420 MS
SP GR UR STRIP: >=1.03 (ref 1–1.03)
TRICYCLICS UR QL SCN: NEGATIVE
UROBILINOGEN UR QL STRIP: ABNORMAL

## 2025-07-10 PROCEDURE — 81003 URINALYSIS AUTO W/O SCOPE: CPT | Performed by: NURSE PRACTITIONER

## 2025-07-10 PROCEDURE — G0463 HOSPITAL OUTPT CLINIC VISIT: HCPCS | Performed by: NURSE PRACTITIONER

## 2025-07-10 PROCEDURE — 93010 ELECTROCARDIOGRAM REPORT: CPT | Performed by: NURSE PRACTITIONER

## 2025-07-10 PROCEDURE — 99214 OFFICE O/P EST MOD 30 MIN: CPT | Performed by: EMERGENCY MEDICINE

## 2025-07-10 PROCEDURE — 93005 ELECTROCARDIOGRAM TRACING: CPT | Performed by: EMERGENCY MEDICINE

## 2025-07-10 PROCEDURE — 63710000001 ONDANSETRON ODT 4 MG TABLET DISPERSIBLE: Performed by: NURSE PRACTITIONER

## 2025-07-10 PROCEDURE — 71045 X-RAY EXAM CHEST 1 VIEW: CPT

## 2025-07-10 PROCEDURE — 80306 DRUG TEST PRSMV INSTRMNT: CPT | Performed by: NURSE PRACTITIONER

## 2025-07-10 RX ORDER — ONDANSETRON 4 MG/1
4 TABLET, ORALLY DISINTEGRATING ORAL ONCE
Status: COMPLETED | OUTPATIENT
Start: 2025-07-10 | End: 2025-07-10

## 2025-07-10 RX ADMIN — ONDANSETRON 4 MG: 4 TABLET, ORALLY DISINTEGRATING ORAL at 18:35

## 2025-07-10 NOTE — Clinical Note
[facility] FSED [place] [address] [place] [address] [place] [address]  Phone: [phone]    Jeffrey Gonzalez was seen and treated in our emergency department on 7/10/2025.  He may return to work on 07/12/2025.         Thank you for choosing [facility].    Lynn Long APRN

## 2025-07-10 NOTE — DISCHARGE INSTRUCTIONS
Call for a follow up appointment with your primary care for for reevaluation and further treatment.    Tylenol/Motrin as needed for pain/fevers    Make sure patient is drinking plenty of fluids.    Return for any new or worsening symptoms.      Voice recognition transcription technology was used for documentation on this chart.  Result there may be some typos and/or introduced into the chart that were overlooked during editing reviewing.

## 2025-07-10 NOTE — FSED PROVIDER NOTE
Subjective   History of Present Illness  Patient is a 24-year-old male who presents to the ER with severe chest pain for the past week.  Patient reports the pain is worse while he is at work.  Patient reports that he works outside as a . Patient reports he has also had some dizziness and nausea. Patient requesting work note.     History provided by:  Patient   used: No        Review of Systems    Past Medical History:   Diagnosis Date    Anemia     Anxiety     Asthma     Chronic diarrhea     Depression     GERD (gastroesophageal reflux disease) 2021    Irritable bowel syndrome 2021       No Known Allergies    Past Surgical History:   Procedure Laterality Date    COLONOSCOPY  2021    TONSILLECTOMY  2003    UPPER GASTROINTESTINAL ENDOSCOPY  2021       Family History   Problem Relation Age of Onset    Ulcerative colitis Maternal Grandmother     Asthma Maternal Grandmother     Cancer Maternal Grandmother     COPD Maternal Grandmother     Colon cancer Maternal Grandfather     Liver cancer Maternal Grandfather     Cancer Maternal Grandfather     COPD Maternal Grandfather     Early death Maternal Grandfather     Heart disease Maternal Grandfather     Anxiety disorder Mother     Asthma Mother     Depression Mother     Alcohol abuse Father     Mental illness Father     Anxiety disorder Sister     Asthma Sister     Depression Sister     Developmental Disability Sister     Learning disabilities Sister     Mental illness Sister     Anxiety disorder Sister     Asthma Sister     Depression Sister     Developmental Disability Sister     Learning disabilities Sister     Mental illness Sister     Anxiety disorder Brother     Depression Brother     Drug abuse Maternal Uncle     Drug abuse Maternal Uncle     Heart disease Maternal Uncle        Social History     Socioeconomic History    Marital status: Single   Tobacco Use    Smoking status: Never     Passive exposure: Never    Smokeless tobacco:  Never   Vaping Use    Vaping status: Never Used   Substance and Sexual Activity    Alcohol use: Yes    Drug use: Not Currently    Sexual activity: Not Currently     Partners: Female     Birth control/protection: Condom           Objective   Physical Exam  Vitals and nursing note reviewed.   Constitutional:       Appearance: Normal appearance. He is well-developed.   HENT:      Head: Normocephalic and atraumatic.      Right Ear: Tympanic membrane and ear canal normal.      Left Ear: Tympanic membrane and ear canal normal.      Nose: Nose normal.      Mouth/Throat:      Lips: Pink.      Mouth: Mucous membranes are moist.      Pharynx: Oropharynx is clear.   Eyes:      Extraocular Movements: Extraocular movements intact.      Pupils: Pupils are equal, round, and reactive to light.   Cardiovascular:      Rate and Rhythm: Normal rate and regular rhythm.      Pulses: Normal pulses.      Heart sounds: Normal heart sounds.   Pulmonary:      Effort: Pulmonary effort is normal.      Breath sounds: Normal breath sounds and air entry.   Abdominal:      General: Bowel sounds are normal.      Palpations: Abdomen is soft.   Musculoskeletal:         General: Normal range of motion.      Cervical back: Full passive range of motion without pain, normal range of motion and neck supple.   Skin:     General: Skin is warm and dry.   Neurological:      General: No focal deficit present.      Mental Status: He is alert and oriented to person, place, and time.   Psychiatric:         Mood and Affect: Mood normal.         Behavior: Behavior normal. Behavior is cooperative.         Procedures           ED Course  ED Course as of 07/10/25 1902   Thu Jul 10, 2025   1810 EKG shows sinus rhythm.  The rate is 83 bpm.  No acute ST segment or T wave changes noted.  No ectopy.  Intervals are normal. [KZ]   1829 XR CHEST 1 VW     Date of Exam: 7/10/2025 6:07 PM EDT     Indication: chest pain, reports feels like he cant get a breath, hx of asthma      Comparison: 1/3/2021 radiograph     Findings:  Unremarkable cardiomediastinal silhouette. Mild perihilar peribronchial cuffing. No focal airspace consolidation. No pleural effusion or pneumothorax. No acute osseous abnormality.     IMPRESSION:  Impression:  Mild perihilar peribronchial cuffing which can be seen with reactive airways disease. No focal airspace consolidation.   [DS]   1832 Blood, UA(!): Trace [DS]   1832 Protein, UA(!): 100 mg/dL (2+) [DS]      ED Course User Index  [DS] Lynn Long APRN  [KZ] Stefano Van MD                HEART Score: 0                            Medical Decision Making  Patient is a 24-year-old male who presents to the ER with severe chest pain for the past week.  Patient reports the pain is worse while he is at work.  Patient reports that he works outside as a . Patient reports he has also had some dizziness and nausea. Patient requesting work note. Exam without evidence of volume overload so doubt heart failure. EKG without signs of active ischemia. Given the timing of pain to ER presentation, single troponin is negative so doubt NSTEMI. Presentation not consistent with acute PE, pneumothorax (not visualized on chest xr), thoracic aortic dissection, pericarditis, tamponade, pneumonia (no infectious symptoms, clear chest xr), myocarditis (no recent illness, neg trop). HEART score: 0. Patient advised to follow up with primary care.       Problems Addressed:  Chest pain, unspecified type: complicated acute illness or injury    Amount and/or Complexity of Data Reviewed  Labs:  Decision-making details documented in ED Course.  Radiology: ordered.  ECG/medicine tests: ordered.    Risk  Prescription drug management.        Final diagnoses:   Chest pain, unspecified type       ED Disposition  ED Disposition       ED Disposition   Discharge    Condition   Stable    Comment   --               PATIENT CONNECTION - [facility]  [address]  [phone]  In 1 week  As needed, If symptoms worsen, if you call this number they will help you find a primary care physician if needed.         Medication List      No changes were made to your prescriptions during this visit.

## 2025-08-18 ENCOUNTER — OFFICE VISIT (OUTPATIENT)
Dept: FAMILY MEDICINE CLINIC | Facility: CLINIC | Age: 25
End: 2025-08-18
Payer: OTHER GOVERNMENT

## 2025-08-18 ENCOUNTER — LAB (OUTPATIENT)
Dept: FAMILY MEDICINE CLINIC | Facility: CLINIC | Age: 25
End: 2025-08-18
Payer: OTHER GOVERNMENT

## 2025-08-18 VITALS
WEIGHT: 203 LBS | HEIGHT: 68 IN | OXYGEN SATURATION: 97 % | SYSTOLIC BLOOD PRESSURE: 130 MMHG | TEMPERATURE: 97.7 F | DIASTOLIC BLOOD PRESSURE: 88 MMHG | BODY MASS INDEX: 30.77 KG/M2 | HEART RATE: 85 BPM

## 2025-08-18 DIAGNOSIS — R51.9 NONINTRACTABLE HEADACHE, UNSPECIFIED CHRONICITY PATTERN, UNSPECIFIED HEADACHE TYPE: Primary | ICD-10-CM

## 2025-08-18 DIAGNOSIS — G47.00 INSOMNIA, UNSPECIFIED TYPE: ICD-10-CM

## 2025-08-18 DIAGNOSIS — J45.40 MODERATE PERSISTENT REACTIVE AIRWAY DISEASE WITHOUT COMPLICATION: ICD-10-CM

## 2025-08-18 DIAGNOSIS — R07.9 CHEST PAIN, UNSPECIFIED TYPE: ICD-10-CM

## 2025-08-18 LAB
ALBUMIN SERPL-MCNC: 4.7 G/DL (ref 3.5–5.2)
ALBUMIN/GLOB SERPL: 1.4 G/DL
ALP SERPL-CCNC: 122 U/L (ref 39–117)
ALT SERPL W P-5'-P-CCNC: 13 U/L (ref 1–41)
ANION GAP SERPL CALCULATED.3IONS-SCNC: 13.6 MMOL/L (ref 5–15)
AST SERPL-CCNC: 22 U/L (ref 1–40)
BASOPHILS # BLD MANUAL: 0 10*3/MM3 (ref 0–0.2)
BASOPHILS NFR BLD MANUAL: 0 % (ref 0–1.5)
BILIRUB SERPL-MCNC: 0.4 MG/DL (ref 0–1.2)
BUN SERPL-MCNC: 9 MG/DL (ref 6–20)
BUN/CREAT SERPL: 13.2 (ref 7–25)
CALCIUM SPEC-SCNC: 9.9 MG/DL (ref 8.6–10.5)
CHLORIDE SERPL-SCNC: 107 MMOL/L (ref 98–107)
CHOLEST SERPL-MCNC: 128 MG/DL (ref 0–200)
CO2 SERPL-SCNC: 21.4 MMOL/L (ref 22–29)
CREAT SERPL-MCNC: 0.68 MG/DL (ref 0.76–1.27)
DACRYOCYTES BLD QL SMEAR: ABNORMAL
DEPRECATED RDW RBC AUTO: 41.2 FL (ref 37–54)
EGFRCR SERPLBLD CKD-EPI 2021: 133.9 ML/MIN/1.73
EOSINOPHIL # BLD MANUAL: 0.06 10*3/MM3 (ref 0–0.4)
EOSINOPHIL NFR BLD MANUAL: 1 % (ref 0.3–6.2)
ERYTHROCYTE [DISTWIDTH] IN BLOOD BY AUTOMATED COUNT: 13.1 % (ref 12.3–15.4)
GLOBULIN UR ELPH-MCNC: 3.3 GM/DL
GLUCOSE SERPL-MCNC: 76 MG/DL (ref 65–99)
HCT VFR BLD AUTO: 46.7 % (ref 37.5–51)
HDLC SERPL-MCNC: 23 MG/DL (ref 40–60)
HGB BLD-MCNC: 15.4 G/DL (ref 13–17.7)
LDLC SERPL CALC-MCNC: 89 MG/DL (ref 0–100)
LDLC/HDLC SERPL: 3.84 {RATIO}
LYMPHOCYTES # BLD MANUAL: 2.6 10*3/MM3 (ref 0.7–3.1)
LYMPHOCYTES NFR BLD MANUAL: 9.1 % (ref 5–12)
MCH RBC QN AUTO: 29 PG (ref 26.6–33)
MCHC RBC AUTO-ENTMCNC: 33 G/DL (ref 31.5–35.7)
MCV RBC AUTO: 87.9 FL (ref 79–97)
MONOCYTES # BLD: 0.52 10*3/MM3 (ref 0.1–0.9)
NEUTROPHILS # BLD AUTO: 2.54 10*3/MM3 (ref 1.7–7)
NEUTROPHILS NFR BLD MANUAL: 44.4 % (ref 42.7–76)
PLAT MORPH BLD: NORMAL
PLATELET # BLD AUTO: 275 10*3/MM3 (ref 140–450)
PMV BLD AUTO: 9.4 FL (ref 6–12)
POIKILOCYTOSIS BLD QL SMEAR: ABNORMAL
POTASSIUM SERPL-SCNC: 4.3 MMOL/L (ref 3.5–5.2)
PROT SERPL-MCNC: 8 G/DL (ref 6–8.5)
RBC # BLD AUTO: 5.31 10*6/MM3 (ref 4.14–5.8)
SODIUM SERPL-SCNC: 142 MMOL/L (ref 136–145)
TRIGL SERPL-MCNC: 83 MG/DL (ref 0–150)
VARIANT LYMPHS NFR BLD MANUAL: 45.5 % (ref 19.6–45.3)
VLDLC SERPL-MCNC: 16 MG/DL (ref 5–40)
WBC MORPH BLD: NORMAL
WBC NRBC COR # BLD AUTO: 5.72 10*3/MM3 (ref 3.4–10.8)

## 2025-08-18 PROCEDURE — 80053 COMPREHEN METABOLIC PANEL: CPT | Performed by: INTERNAL MEDICINE

## 2025-08-18 PROCEDURE — 85027 COMPLETE CBC AUTOMATED: CPT | Performed by: INTERNAL MEDICINE

## 2025-08-18 PROCEDURE — 85007 BL SMEAR W/DIFF WBC COUNT: CPT | Performed by: INTERNAL MEDICINE

## 2025-08-18 PROCEDURE — 80061 LIPID PANEL: CPT | Performed by: INTERNAL MEDICINE

## 2025-08-18 RX ORDER — FLUTICASONE PROPIONATE AND SALMETEROL XINAFOATE 230; 21 UG/1; UG/1
2 AEROSOL, METERED RESPIRATORY (INHALATION)
Qty: 8 G | Refills: 2 | Status: SHIPPED | OUTPATIENT
Start: 2025-08-18 | End: 2025-09-17

## 2025-08-18 RX ORDER — DEXAMETHASONE 4 MG/1
4 TABLET ORAL DAILY
Qty: 4 TABLET | Refills: 0 | Status: SHIPPED | OUTPATIENT
Start: 2025-08-18 | End: 2025-08-22

## 2025-08-18 RX ORDER — PROCHLORPERAZINE MALEATE 10 MG
10 TABLET ORAL EVERY 8 HOURS PRN
Qty: 20 TABLET | Refills: 0 | Status: SHIPPED | OUTPATIENT
Start: 2025-08-18 | End: 2025-09-07

## 2025-08-18 RX ORDER — DIPHENHYDRAMINE HCL 25 MG
25 TABLET ORAL EVERY 8 HOURS PRN
Qty: 20 TABLET | Refills: 0 | Status: SHIPPED | OUTPATIENT
Start: 2025-08-18

## 2025-08-18 RX ORDER — PREDNISONE 20 MG/1
40 TABLET ORAL DAILY
Qty: 8 TABLET | Refills: 0 | Status: CANCELLED | OUTPATIENT
Start: 2025-08-18 | End: 2025-08-22

## 2025-08-18 RX ORDER — INDOMETHACIN 25 MG/1
25 CAPSULE ORAL 3 TIMES DAILY PRN
Qty: 15 CAPSULE | Refills: 0 | Status: SHIPPED | OUTPATIENT
Start: 2025-08-18 | End: 2025-08-23

## 2025-08-18 RX ORDER — NORTRIPTYLINE HYDROCHLORIDE 25 MG/1
25 CAPSULE ORAL NIGHTLY
Qty: 30 CAPSULE | Refills: 0 | Status: SHIPPED | OUTPATIENT
Start: 2025-08-18 | End: 2025-09-17